# Patient Record
Sex: FEMALE | Race: WHITE | NOT HISPANIC OR LATINO | Employment: UNEMPLOYED | URBAN - METROPOLITAN AREA
[De-identification: names, ages, dates, MRNs, and addresses within clinical notes are randomized per-mention and may not be internally consistent; named-entity substitution may affect disease eponyms.]

---

## 2022-02-03 RX ORDER — DIPHENOXYLATE HYDROCHLORIDE AND ATROPINE SULFATE 2.5; .025 MG/1; MG/1
1 TABLET ORAL DAILY
COMMUNITY

## 2022-02-04 ENCOUNTER — APPOINTMENT (OUTPATIENT)
Dept: LAB | Facility: CLINIC | Age: 57
End: 2022-02-04
Payer: COMMERCIAL

## 2022-02-04 ENCOUNTER — OFFICE VISIT (OUTPATIENT)
Dept: FAMILY MEDICINE CLINIC | Facility: CLINIC | Age: 57
End: 2022-02-04
Payer: COMMERCIAL

## 2022-02-04 VITALS
BODY MASS INDEX: 23.49 KG/M2 | SYSTOLIC BLOOD PRESSURE: 136 MMHG | HEART RATE: 84 BPM | HEIGHT: 65 IN | WEIGHT: 141 LBS | DIASTOLIC BLOOD PRESSURE: 82 MMHG

## 2022-02-04 DIAGNOSIS — H26.9 CATARACT OF RIGHT EYE, UNSPECIFIED CATARACT TYPE: ICD-10-CM

## 2022-02-04 DIAGNOSIS — R79.89 ABNORMAL TSH: ICD-10-CM

## 2022-02-04 DIAGNOSIS — I49.3 PREMATURE VENTRICULAR CONTRACTIONS (PVCS) (VPCS): ICD-10-CM

## 2022-02-04 DIAGNOSIS — Z01.818 PREOPERATIVE CLEARANCE: ICD-10-CM

## 2022-02-04 DIAGNOSIS — Z12.11 SCREEN FOR COLON CANCER: ICD-10-CM

## 2022-02-04 DIAGNOSIS — I10 ELEVATED BLOOD PRESSURE READING IN OFFICE WITH DIAGNOSIS OF HYPERTENSION: ICD-10-CM

## 2022-02-04 DIAGNOSIS — Z11.4 SCREENING FOR HIV (HUMAN IMMUNODEFICIENCY VIRUS): ICD-10-CM

## 2022-02-04 DIAGNOSIS — Z13.220 ENCOUNTER FOR SCREENING FOR LIPID DISORDER: ICD-10-CM

## 2022-02-04 DIAGNOSIS — Z11.59 NEED FOR HEPATITIS C SCREENING TEST: ICD-10-CM

## 2022-02-04 DIAGNOSIS — R00.2 PALPITATIONS: ICD-10-CM

## 2022-02-04 DIAGNOSIS — Z72.0 TOBACCO ABUSE: ICD-10-CM

## 2022-02-04 DIAGNOSIS — Z01.818 PREOPERATIVE CLEARANCE: Primary | ICD-10-CM

## 2022-02-04 DIAGNOSIS — Z12.4 SCREENING FOR CERVICAL CANCER: ICD-10-CM

## 2022-02-04 DIAGNOSIS — Z12.31 ENCOUNTER FOR SCREENING MAMMOGRAM FOR MALIGNANT NEOPLASM OF BREAST: ICD-10-CM

## 2022-02-04 DIAGNOSIS — R31.21 ASYMPTOMATIC MICROSCOPIC HEMATURIA: ICD-10-CM

## 2022-02-04 DIAGNOSIS — Z00.00 HEALTH CARE MAINTENANCE: ICD-10-CM

## 2022-02-04 LAB
ALBUMIN SERPL BCP-MCNC: 3.8 G/DL (ref 3.5–5)
ALP SERPL-CCNC: 73 U/L (ref 46–116)
ALT SERPL W P-5'-P-CCNC: 21 U/L (ref 12–78)
AMORPH URATE CRY URNS QL MICRO: ABNORMAL /HPF
ANION GAP SERPL CALCULATED.3IONS-SCNC: 6 MMOL/L (ref 4–13)
AST SERPL W P-5'-P-CCNC: 16 U/L (ref 5–45)
BACTERIA UR QL AUTO: ABNORMAL /HPF
BILIRUB SERPL-MCNC: 0.92 MG/DL (ref 0.2–1)
BILIRUB UR QL STRIP: NEGATIVE
BUN SERPL-MCNC: 20 MG/DL (ref 5–25)
CALCIUM SERPL-MCNC: 9.6 MG/DL (ref 8.3–10.1)
CHLORIDE SERPL-SCNC: 106 MMOL/L (ref 100–108)
CHOLEST SERPL-MCNC: 240 MG/DL
CLARITY UR: ABNORMAL
CO2 SERPL-SCNC: 26 MMOL/L (ref 21–32)
COLOR UR: ABNORMAL
CREAT SERPL-MCNC: 0.72 MG/DL (ref 0.6–1.3)
ERYTHROCYTE [DISTWIDTH] IN BLOOD BY AUTOMATED COUNT: 12 % (ref 11.6–15.1)
GFR SERPL CREATININE-BSD FRML MDRD: 93 ML/MIN/1.73SQ M
GLUCOSE SERPL-MCNC: 104 MG/DL (ref 65–140)
GLUCOSE UR STRIP-MCNC: NEGATIVE MG/DL
HCT VFR BLD AUTO: 37.8 % (ref 34.8–46.1)
HDLC SERPL-MCNC: 50 MG/DL
HGB BLD-MCNC: 13 G/DL (ref 11.5–15.4)
HGB UR QL STRIP.AUTO: ABNORMAL
KETONES UR STRIP-MCNC: NEGATIVE MG/DL
LDLC SERPL DIRECT ASSAY-MCNC: 89 MG/DL (ref 0–100)
LEUKOCYTE ESTERASE UR QL STRIP: ABNORMAL
MCH RBC QN AUTO: 32 PG (ref 26.8–34.3)
MCHC RBC AUTO-ENTMCNC: 34.4 G/DL (ref 31.4–37.4)
MCV RBC AUTO: 93 FL (ref 82–98)
NITRITE UR QL STRIP: NEGATIVE
NON-SQ EPI CELLS URNS QL MICRO: ABNORMAL /HPF
PH UR STRIP.AUTO: 5.5 [PH]
PLATELET # BLD AUTO: 329 THOUSANDS/UL (ref 149–390)
PMV BLD AUTO: 10 FL (ref 8.9–12.7)
POTASSIUM SERPL-SCNC: 4.3 MMOL/L (ref 3.5–5.3)
PROT SERPL-MCNC: 7.8 G/DL (ref 6.4–8.2)
PROT UR STRIP-MCNC: NEGATIVE MG/DL
RBC # BLD AUTO: 4.06 MILLION/UL (ref 3.81–5.12)
RBC #/AREA URNS AUTO: ABNORMAL /HPF
SODIUM SERPL-SCNC: 138 MMOL/L (ref 136–145)
SP GR UR STRIP.AUTO: >=1.03 (ref 1–1.03)
TRIGL SERPL-MCNC: 509 MG/DL
TSH SERPL DL<=0.05 MIU/L-ACNC: 1.36 UIU/ML (ref 0.36–3.74)
UROBILINOGEN UR QL STRIP.AUTO: 0.2 E.U./DL
WBC # BLD AUTO: 7.01 THOUSAND/UL (ref 4.31–10.16)
WBC #/AREA URNS AUTO: ABNORMAL /HPF

## 2022-02-04 PROCEDURE — 80061 LIPID PANEL: CPT

## 2022-02-04 PROCEDURE — 99204 OFFICE O/P NEW MOD 45 MIN: CPT | Performed by: FAMILY MEDICINE

## 2022-02-04 PROCEDURE — 83721 ASSAY OF BLOOD LIPOPROTEIN: CPT

## 2022-02-04 PROCEDURE — 85027 COMPLETE CBC AUTOMATED: CPT

## 2022-02-04 PROCEDURE — 80053 COMPREHEN METABOLIC PANEL: CPT

## 2022-02-04 PROCEDURE — 84443 ASSAY THYROID STIM HORMONE: CPT

## 2022-02-04 PROCEDURE — 93000 ELECTROCARDIOGRAM COMPLETE: CPT | Performed by: FAMILY MEDICINE

## 2022-02-04 PROCEDURE — 3008F BODY MASS INDEX DOCD: CPT | Performed by: FAMILY MEDICINE

## 2022-02-04 PROCEDURE — 36415 COLL VENOUS BLD VENIPUNCTURE: CPT

## 2022-02-04 PROCEDURE — 81001 URINALYSIS AUTO W/SCOPE: CPT

## 2022-02-04 NOTE — PROGRESS NOTES
Assessment and Plan:  1  Preoperative clearance, exam completed EKG reviewed   2  Cataract, OD, for surgery 02/15/2022 at [facility] with Dr Lennox Hastings  2  PVC/palpitation, EKG normal today no PVC seen  3  Abnormal TSH, blood work ordered   4  Hematuria seen by Urology  5  Healthcare maintenance blood work ordered to include lipid panel, hepatitis-C and HIV  6  Tobacco abuse, complete cessation recommended  7  Elevated blood pressure normalized by end of office visit  8   Return in 1 year sooner if needed    Problem List Items Addressed This Visit        Cardiovascular and Mediastinum    Premature ventricular contractions (PVCs) (VPCs)     EKG completed         Relevant Orders    CBC    Comprehensive metabolic panel    Lipid Panel with Direct LDL reflex    TSH, 3rd generation with Free T4 reflex    UA (URINE) with reflex to Scope       Genitourinary    Asymptomatic microscopic hematuria     Seen by urology in the past         Relevant Orders    CBC    Comprehensive metabolic panel    Lipid Panel with Direct LDL reflex    TSH, 3rd generation with Free T4 reflex    UA (URINE) with reflex to Scope       Other    Abnormal TSH     Blood work ordered         Relevant Orders    CBC    Comprehensive metabolic panel    Lipid Panel with Direct LDL reflex    TSH, 3rd generation with Free T4 reflex    UA (URINE) with reflex to Scope    Palpitations     EKG completed         Relevant Orders    CBC    Comprehensive metabolic panel    Lipid Panel with Direct LDL reflex    TSH, 3rd generation with Free T4 reflex    UA (URINE) with reflex to Scope    Health care maintenance     Routine blood work ordered to include hepatitis C, HIV, lipid panel         Relevant Orders    CBC    Comprehensive metabolic panel    Lipid Panel with Direct LDL reflex    TSH, 3rd generation with Free T4 reflex    UA (URINE) with reflex to Scope    Screen for colon cancer     Referred for colonoscopy         Relevant Orders    Ambulatory referral for colonoscopy    CBC    Comprehensive metabolic panel    Lipid Panel with Direct LDL reflex    TSH, 3rd generation with Free T4 reflex    UA (URINE) with reflex to Scope    Tobacco abuse     Complete cessation recommended         Relevant Orders    CBC    Comprehensive metabolic panel    Lipid Panel with Direct LDL reflex    TSH, 3rd generation with Free T4 reflex    UA (URINE) with reflex to Scope      Other Visit Diagnoses     Preoperative clearance    -  Primary    Relevant Orders    CBC    Comprehensive metabolic panel    Lipid Panel with Direct LDL reflex    TSH, 3rd generation with Free T4 reflex    UA (URINE) with reflex to Scope    POCT ECG (Completed)    Encounter for screening mammogram for malignant neoplasm of breast        Relevant Orders    Mammo screening bilateral w 3d & cad    CBC    Comprehensive metabolic panel    Lipid Panel with Direct LDL reflex    TSH, 3rd generation with Free T4 reflex    UA (URINE) with reflex to Scope    Screening for cervical cancer        Relevant Orders    Ambulatory Referral to Gynecology    CBC    Comprehensive metabolic panel    Lipid Panel with Direct LDL reflex    TSH, 3rd generation with Free T4 reflex    UA (URINE) with reflex to Scope    Cataract of right eye, unspecified cataract type        Relevant Orders    CBC    Comprehensive metabolic panel    Lipid Panel with Direct LDL reflex    TSH, 3rd generation with Free T4 reflex    UA (URINE) with reflex to Scope    Need for hepatitis C screening test        Relevant Orders    Hepatitis C Antibody ([facility])    CBC    Comprehensive metabolic panel    Lipid Panel with Direct LDL reflex    TSH, 3rd generation with Free T4 reflex    UA (URINE) with reflex to Scope    Screening for HIV (human immunodeficiency virus)        Relevant Orders    HIV 1/2 Antigen/Antibody (4th Generation) w Reflex [facility]    CBC    Comprehensive metabolic panel    Lipid Panel with Direct LDL reflex    TSH, 3rd generation with Free T4 reflex    UA (URINE) with reflex to Scope    Encounter for screening for lipid disorder        Relevant Orders    CBC    Comprehensive metabolic panel    Lipid Panel with Direct LDL reflex    TSH, 3rd generation with Free T4 reflex    UA (URINE) with reflex to Scope    Elevated blood pressure reading in office with diagnosis of hypertension                     Diagnoses and all orders for this visit:    Preoperative clearance  -     CBC; Future  -     Comprehensive metabolic panel; Future  -     Lipid Panel with Direct LDL reflex; Future  -     TSH, 3rd generation with Free T4 reflex; Future  -     UA (URINE) with reflex to Scope; Future  -     POCT ECG    Screen for colon cancer  -     Ambulatory referral for colonoscopy; Future  -     CBC; Future  -     Comprehensive metabolic panel; Future  -     Lipid Panel with Direct LDL reflex; Future  -     TSH, 3rd generation with Free T4 reflex; Future  -     UA (URINE) with reflex to Scope; Future    Encounter for screening mammogram for malignant neoplasm of breast  -     Mammo screening bilateral w 3d & cad; Future  -     CBC; Future  -     Comprehensive metabolic panel; Future  -     Lipid Panel with Direct LDL reflex; Future  -     TSH, 3rd generation with Free T4 reflex; Future  -     UA (URINE) with reflex to Scope; Future    Screening for cervical cancer  -     Ambulatory Referral to Gynecology; Future  -     CBC; Future  -     Comprehensive metabolic panel; Future  -     Lipid Panel with Direct LDL reflex; Future  -     TSH, 3rd generation with Free T4 reflex; Future  -     UA (URINE) with reflex to Scope; Future    Cataract of right eye, unspecified cataract type  -     CBC; Future  -     Comprehensive metabolic panel; Future  -     Lipid Panel with Direct LDL reflex; Future  -     TSH, 3rd generation with Free T4 reflex; Future  -     UA (URINE) with reflex to Scope; Future    Premature ventricular contractions (PVCs) (VPCs)  -     CBC;  Future  - Comprehensive metabolic panel; Future  -     Lipid Panel with Direct LDL reflex; Future  -     TSH, 3rd generation with Free T4 reflex; Future  -     UA (URINE) with reflex to Scope; Future    Tobacco abuse  -     CBC; Future  -     Comprehensive metabolic panel; Future  -     Lipid Panel with Direct LDL reflex; Future  -     TSH, 3rd generation with Free T4 reflex; Future  -     UA (URINE) with reflex to Scope; Future    Palpitations  -     CBC; Future  -     Comprehensive metabolic panel; Future  -     Lipid Panel with Direct LDL reflex; Future  -     TSH, 3rd generation with Free T4 reflex; Future  -     UA (URINE) with reflex to Scope; Future    Abnormal TSH  -     CBC; Future  -     Comprehensive metabolic panel; Future  -     Lipid Panel with Direct LDL reflex; Future  -     TSH, 3rd generation with Free T4 reflex; Future  -     UA (URINE) with reflex to Scope; Future    Asymptomatic microscopic hematuria  -     CBC; Future  -     Comprehensive metabolic panel; Future  -     Lipid Panel with Direct LDL reflex; Future  -     TSH, 3rd generation with Free T4 reflex; Future  -     UA (URINE) with reflex to Scope; Future    Need for hepatitis C screening test  -     Hepatitis C Antibody ([facility]); Future  -     CBC; Future  -     Comprehensive metabolic panel; Future  -     Lipid Panel with Direct LDL reflex; Future  -     TSH, 3rd generation with Free T4 reflex; Future  -     UA (URINE) with reflex to Scope; Future    Screening for HIV (human immunodeficiency virus)  -     HIV 1/2 Antigen/Antibody (4th Generation) w Reflex [facility]; Future  -     CBC; Future  -     Comprehensive metabolic panel; Future  -     Lipid Panel with Direct LDL reflex; Future  -     TSH, 3rd generation with Free T4 reflex; Future  -     UA (URINE) with reflex to Scope; Future    Encounter for screening for lipid disorder  -     CBC; Future  -     Comprehensive metabolic panel;  Future  -     Lipid Panel with Direct LDL reflex; Future  -     TSH, 3rd generation with Free T4 reflex; Future  -     UA (URINE) with reflex to Scope; Future    Health care maintenance  -     CBC; Future  -     Comprehensive metabolic panel; Future  -     Lipid Panel with Direct LDL reflex; Future  -     TSH, 3rd generation with Free T4 reflex; Future  -     UA (URINE) with reflex to Scope; Future    Elevated blood pressure reading in office with diagnosis of hypertension    Other orders  -     multivitamin (THERAGRAN) TABS; Take 1 tablet by mouth daily              Subjective:      Patient ID: Piter Wallace is a 65 y o  female  CC:    Chief Complaint   Patient presents with   [facility]    Pre-op Exam     Cataract surgery Right eye scheduled 2/15 [facility] Dr Carlus Milch       HPI:    Patient is here for preoperative clearance she will be getting a right cataract surgery 02/15/2022 by Dr Hans Downey at [facility]      The following portions of the patient's history were reviewed and updated as appropriate: allergies, current medications, past family history, past medical history, past social history, past surgical history and problem list       Review of Systems   Constitutional: Negative  HENT: Negative  Eyes:        HPI   Respiratory: Negative  Cardiovascular: Negative  Gastrointestinal:        Has not had a colonoscopy   Endocrine: Negative  Genitourinary:        Overdue for gyn evaluation   Musculoskeletal: Negative  Skin: Negative  Allergic/Immunologic: Negative  Neurological: Negative  Hematological: Negative  Psychiatric/Behavioral: Negative  Data to review:       Objective:    Vitals:    02/04/22 0841 02/04/22 0904   BP: 160/86 136/82   Pulse: 84    Weight: 64 kg (141 lb)    Height: 5' 4 5" (1 638 m)         Physical Exam  Vitals and nursing note reviewed  Constitutional:       Appearance: Normal appearance  HENT:      Head: Normocephalic and atraumatic        Right Ear: Tympanic membrane normal       Left Ear: Tympanic membrane normal       Nose: Nose normal       Mouth/Throat:      Mouth: Mucous membranes are moist       Pharynx: Oropharynx is clear  No oropharyngeal exudate or posterior oropharyngeal erythema  Eyes:      General: No scleral icterus  Neck:      Vascular: No carotid bruit  Cardiovascular:      Rate and Rhythm: Normal rate and regular rhythm  Heart sounds: Normal heart sounds  Pulmonary:      Effort: Pulmonary effort is normal       Breath sounds: Normal breath sounds  Abdominal:      General: Bowel sounds are normal       Palpations: Abdomen is soft  Tenderness: There is no abdominal tenderness  Musculoskeletal:      Cervical back: Neck supple  Right lower leg: No edema  Left lower leg: No edema  Skin:     General: Skin is warm and dry  Neurological:      General: No focal deficit present  Mental Status: She is alert  Psychiatric:         Mood and Affect: Mood normal              BMI Counseling: Body mass index is 23 83 kg/m²  The BMI is above normal  Nutrition recommendations include reducing intake of cholesterol

## 2022-02-04 NOTE — PATIENT INSTRUCTIONS
Complete blood work today   Complete mammography as ordered   Complete gyn evaluation as ordered   Complete colonoscopy as ordered   I recommend complete tobacco cessation  Return in 1 year sooner if needed, depended on above blood work

## 2022-02-07 PROBLEM — E78.2 MIXED HYPERLIPIDEMIA: Status: ACTIVE | Noted: 2022-02-07

## 2022-10-12 PROBLEM — Z12.11 SCREEN FOR COLON CANCER: Status: RESOLVED | Noted: 2022-02-04 | Resolved: 2022-10-12

## 2022-10-12 PROBLEM — Z00.00 HEALTH CARE MAINTENANCE: Status: RESOLVED | Noted: 2022-02-04 | Resolved: 2022-10-12

## 2022-10-25 ENCOUNTER — TELEPHONE (OUTPATIENT)
Dept: GASTROENTEROLOGY | Facility: CLINIC | Age: 57
End: 2022-10-25

## 2022-10-25 DIAGNOSIS — Z12.11 SCREEN FOR COLON CANCER: Primary | ICD-10-CM

## 2022-10-25 NOTE — TELEPHONE ENCOUNTER
Scheduled date of colonoscopy (as of today):12 09 22  Physician performing colonoscopy:DR CHAVIRA  Location of colonoscopy:[place]  Bowel prep reviewed with patient:KATH  Instructions reviewed with patient by:SUMEET TAVERAS  Clearances: N/A      09/14/22  Screened by: Juan Diego Little    Referring Provider Dr Michael Vee: Body mass index is 23 83 kg/m²  Has patient been referred for a routine screening Colonoscopy? yes  Is the patient between 39-70 years old? yes      Previous Colonoscopy no   If yes:    Date:     Facility:    Reason:       SCHEDULING STAFF: If the patient is between 45yrs-49yrs, please advise patient to confirm benefits/coverage with their insurance company for a routine screening colonoscopy, some insurance carriers will only cover at [address] or older  If the patient is over 66years old, please schedule an office visit  Does the patient want to see a Gastroenterologist prior to their procedure OR are they having any GI symptoms? no    Has the patient been hospitalized or had abdominal surgery in the past 6 months? no    Does the patient use supplemental oxygen? no    Does the patient take Coumadin, Lovenox, Plavix, Elliquis, Xarelto, or other blood thinning medication? no    Has the patient had a stroke, cardiac event, or stent placed in the past year? no    SCHEDULING STAFF: If patient answers NO to above questions, then schedule procedure  If patient answers YES to above questions, then schedule office appointment  Patient passed OA please reach out to patient at [phone]     If patient is between 45yrs - 49yrs, please advise patient that we will have to confirm benefits & coverage with their insurance company for a routine screening colonoscopy

## 2022-11-11 ENCOUNTER — OFFICE VISIT (OUTPATIENT)
Dept: URGENT CARE | Facility: CLINIC | Age: 57
End: 2022-11-11

## 2022-11-11 VITALS
OXYGEN SATURATION: 97 % | DIASTOLIC BLOOD PRESSURE: 64 MMHG | RESPIRATION RATE: 18 BRPM | TEMPERATURE: 97.9 F | SYSTOLIC BLOOD PRESSURE: 128 MMHG | HEART RATE: 79 BPM

## 2022-11-11 DIAGNOSIS — J01.90 ACUTE SINUSITIS, RECURRENCE NOT SPECIFIED, UNSPECIFIED LOCATION: Primary | ICD-10-CM

## 2022-11-11 RX ORDER — ASCORBIC ACID 500 MG
500 TABLET ORAL DAILY
COMMUNITY

## 2022-11-11 RX ORDER — AMOXICILLIN AND CLAVULANATE POTASSIUM 875; 125 MG/1; MG/1
1 TABLET, FILM COATED ORAL EVERY 12 HOURS SCHEDULED
Qty: 14 TABLET | Refills: 0 | Status: SHIPPED | OUTPATIENT
Start: 2022-11-11 | End: 2022-11-18

## 2022-11-11 NOTE — PROGRESS NOTES
[facility]    NAME: John Cordon is a 65 y o  female  : 1965    MRN: [id number]  DATE: 2022  TIME: 12:18 PM    Assessment and Plan   Acute sinusitis, recurrence not specified, unspecified location [J01 90]  1  Acute sinusitis, recurrence not specified, unspecified location  amoxicillin-clavulanate (AUGMENTIN) 875-125 mg per tablet       Patient Instructions   Patient Instructions   Take antibiotic as instructed  May continue decongestant / expectorant for comfort as needed  Push fluids  Nasal saline rinses every couple hours throughout day may be helpful  Follow up with PCP if not improving over next 7-10 days  Chief Complaint     Chief Complaint   Patient presents with   • Cold Like Symptoms     Patient is c/o right facial pain and pressure  Had started getting sick 2 weeks ago on   Was feeling better on Monday and then started feeling bad again on Wednesday  COVID negative last week       History of Present Illness   John Cordon presents to the clinic c/o  46-year-old male comes in with complaint of sinus problems  Started:  Over 2 weeks ago with general cold symptoms  It has now localized to the right maxillary sinus area with increased pain and pressure  Modifying factors:  Multiple over-the-counter medications  Associated Signs and symptoms:  Pain pressure right maxillary and frontal sinus area  Congested  Thick mucus  Known exposures:  Lives with her daughter who has 3 children  10year-old just started  this year in came home with cold symptoms couple weeks ago  Patient is done  2 home COVID test that have been negative  Review of Systems   Review of Systems   Constitutional: Positive for activity change, appetite change and fatigue  Negative for chills and fever  HENT: Positive for congestion, postnasal drip, rhinorrhea, sinus pressure and sinus pain  Negative for ear discharge, ear pain and sore throat      Eyes: Negative  Respiratory: Negative for cough, chest tightness, shortness of breath and wheezing  Cough resolved   Cardiovascular: Negative  Hematological: Negative  Current Medications     Long-Term Medications   Medication Sig Dispense Refill   • ascorbic acid (VITAMIN C) 500 MG tablet Take 500 mg by mouth daily     • multivitamin (THERAGRAN) TABS Take 1 tablet by mouth daily     • polyethylene glycol (GOLYTELY) 4000 mL solution Take 4,000 mL by mouth once for 1 dose Take as directed by office 4000 mL 0       Current Allergies     Allergies as of 11/11/2022 - Reviewed 11/11/2022   Allergen Reaction Noted   • Tramadol Vomiting 07/15/2014          The following portions of the patient's history were reviewed and updated as appropriate: allergies, current medications, past family history, past medical history, past social history, past surgical history and problem list   History reviewed  No pertinent past medical history  Past Surgical History:   Procedure Laterality Date   • OVARIAN CYST SURGERY  1984     Family History   Problem Relation Age of Onset   • No Known Problems Mother    • Cancer Father    • Stroke Father    • Heart disease Maternal Uncle        Objective   /64   Pulse 79   Temp 97 9 °F (36 6 °C) (Tympanic)   Resp 18   SpO2 97%   No LMP recorded  Patient is postmenopausal        Physical Exam     Physical Exam  Vitals and nursing note reviewed  Constitutional:       General: She is not in acute distress  Appearance: She is well-developed  She is ill-appearing  She is not toxic-appearing or diaphoretic  Comments: Appears mildly ill but in no acute distress  No trismus or conversational dyspnea  HENT:      Head: Normocephalic and atraumatic  Comments: Right maxillary sinus TTP     Right Ear: Tympanic membrane, ear canal and external ear normal       Left Ear: Tympanic membrane, ear canal and external ear normal       Nose: Congestion and rhinorrhea present  Mouth/Throat:      Mouth: Mucous membranes are moist       Pharynx: Posterior oropharyngeal erythema present  No oropharyngeal exudate  Comments: Cobblestoning posterior pharynx with patchy redness  Eyes:      General:         Right eye: No discharge  Left eye: No discharge  Conjunctiva/sclera: Conjunctivae normal       Pupils: Pupils are equal, round, and reactive to light  Cardiovascular:      Rate and Rhythm: Normal rate and regular rhythm  Heart sounds: Normal heart sounds  No murmur heard  No friction rub  No gallop  Pulmonary:      Effort: Pulmonary effort is normal  No respiratory distress  Breath sounds: Normal breath sounds  No stridor  No wheezing, rhonchi or rales  Musculoskeletal:      Cervical back: Normal range of motion and neck supple  No rigidity or tenderness  Lymphadenopathy:      Cervical: No cervical adenopathy  Skin:     General: Skin is warm and dry  Coloration: Skin is not jaundiced or pale  Findings: No rash  Neurological:      Mental Status: She is alert and oriented to person, place, and time     Psychiatric:         Mood and Affect: Mood normal          Behavior: Behavior normal

## 2022-11-11 NOTE — PATIENT INSTRUCTIONS
Take antibiotic as instructed  May continue decongestant / expectorant for comfort as needed  Push fluids  Nasal saline rinses every couple hours throughout day may be helpful  Follow up with PCP if not improving over next 7-10 days

## 2023-02-13 RX ORDER — SODIUM CHLORIDE 9 MG/ML
125 INJECTION, SOLUTION INTRAVENOUS CONTINUOUS
OUTPATIENT
Start: 2023-02-13

## 2023-02-13 RX ORDER — ONDANSETRON 2 MG/ML
4 INJECTION INTRAMUSCULAR; INTRAVENOUS ONCE AS NEEDED
OUTPATIENT
Start: 2023-02-13

## 2023-03-31 ENCOUNTER — TELEPHONE (OUTPATIENT)
Dept: ADMINISTRATIVE | Facility: OTHER | Age: 58
End: 2023-03-31

## 2023-03-31 ENCOUNTER — OFFICE VISIT (OUTPATIENT)
Dept: FAMILY MEDICINE CLINIC | Facility: CLINIC | Age: 58
End: 2023-03-31

## 2023-03-31 VITALS
HEART RATE: 90 BPM | RESPIRATION RATE: 20 BRPM | DIASTOLIC BLOOD PRESSURE: 90 MMHG | BODY MASS INDEX: 24.99 KG/M2 | OXYGEN SATURATION: 98 % | WEIGHT: 150 LBS | TEMPERATURE: 98.3 F | SYSTOLIC BLOOD PRESSURE: 152 MMHG | HEIGHT: 65 IN

## 2023-03-31 DIAGNOSIS — R10.12 LUQ PAIN: Primary | ICD-10-CM

## 2023-03-31 DIAGNOSIS — R31.21 ASYMPTOMATIC MICROSCOPIC HEMATURIA: ICD-10-CM

## 2023-03-31 DIAGNOSIS — Z12.31 BREAST CANCER SCREENING BY MAMMOGRAM: ICD-10-CM

## 2023-03-31 DIAGNOSIS — E78.2 MIXED HYPERLIPIDEMIA: ICD-10-CM

## 2023-03-31 DIAGNOSIS — R19.7 DIARRHEA, UNSPECIFIED TYPE: ICD-10-CM

## 2023-03-31 DIAGNOSIS — M54.50 LOW BACK PAIN, UNSPECIFIED BACK PAIN LATERALITY, UNSPECIFIED CHRONICITY, UNSPECIFIED WHETHER SCIATICA PRESENT: ICD-10-CM

## 2023-03-31 DIAGNOSIS — R20.2 PARESTHESIAS: ICD-10-CM

## 2023-03-31 DIAGNOSIS — Z12.31 ENCOUNTER FOR SCREENING MAMMOGRAM FOR MALIGNANT NEOPLASM OF BREAST: ICD-10-CM

## 2023-03-31 DIAGNOSIS — Z13.1 SCREENING FOR DIABETES MELLITUS: ICD-10-CM

## 2023-03-31 LAB
SL AMB  POCT GLUCOSE, UA: NEGATIVE
SL AMB LEUKOCYTE ESTERASE,UA: NORMAL
SL AMB POCT BILIRUBIN,UA: NEGATIVE
SL AMB POCT BLOOD,UA: NORMAL
SL AMB POCT CLARITY,UA: CLEAR
SL AMB POCT COLOR,UA: YELLOW
SL AMB POCT KETONES,UA: NEGATIVE
SL AMB POCT NITRITE,UA: NEGATIVE
SL AMB POCT PH,UA: 6
SL AMB POCT SPECIFIC GRAVITY,UA: 1.01
SL AMB POCT URINE PROTEIN: NEGATIVE
SL AMB POCT UROBILINOGEN: 0.2

## 2023-03-31 RX ORDER — GABAPENTIN 100 MG/1
100 CAPSULE ORAL 3 TIMES DAILY PRN
Qty: 30 CAPSULE | Refills: 0 | Status: SHIPPED | OUTPATIENT
Start: 2023-03-31

## 2023-03-31 RX ORDER — DICYCLOMINE HYDROCHLORIDE 10 MG/1
10 CAPSULE ORAL 4 TIMES DAILY PRN
Qty: 30 CAPSULE | Refills: 0 | Status: SHIPPED | OUTPATIENT
Start: 2023-03-31

## 2023-03-31 NOTE — ASSESSMENT & PLAN NOTE
Multiple labs were ordered to assess for causes of recurrent abdominal pain including inflammatory, infectious, or insensitivity causes  CT of the abdomen and pelvis was also ordered to assess for causes of recurrent abdominal pain and diarrhea  GI referral was also placed so that colonoscopy can be scheduled  Bentyl was ordered to be used as needed for abdominal pain and diarrhea

## 2023-03-31 NOTE — ASSESSMENT & PLAN NOTE
CT of the abdomen was ordered to assess for any recurrent nephrolithiasis or other causes of recurrent hematuria

## 2023-03-31 NOTE — PROGRESS NOTES
Name: Ricky Perez      : 1965      MRN: [id number]  Encounter Provider: FANTA Coulter  Encounter Date: 3/31/2023   Encounter department: [place] [address]  LUQ pain  Assessment & Plan:  Multiple labs were ordered to assess for causes of recurrent abdominal pain including inflammatory, infectious, or insensitivity causes  CT of the abdomen and pelvis was also ordered to assess for causes of recurrent abdominal pain and diarrhea  GI referral was also placed so that colonoscopy can be scheduled  Bentyl was ordered to be used as needed for abdominal pain and diarrhea  Orders:  -     CBC and differential; Future  -     C-reactive protein; Future  -     Sedimentation rate, automated; Future  -     Celiac Disease Antibody Profile; Future  -     Food Allergy Profile; Future  -     Amylase; Future  -     Lipase; Future  -     CT abdomen pelvis w contrast; Future; Expected date: 2023  -     Ambulatory Referral to Gastroenterology; Future  -     dicyclomine (BENTYL) 10 mg capsule; Take 1 capsule (10 mg total) by mouth 4 (four) times a day as needed (Abdominal pain or diarrhea )    2  Breast cancer screening by mammogram    3  Low back pain, unspecified back pain laterality, unspecified chronicity, unspecified whether sciatica present  -     POCT urine dip  -     Urine culture; Future  -     Urine culture    4  Mixed hyperlipidemia  Assessment & Plan:  Lipid panel was ordered to be completed prior to next office visit to assess the status of this  Patient was advised to continue to limit fatty and fried foods in her diet  Orders:  -     Lipid Panel with Direct LDL reflex; Future    5  Encounter for screening mammogram for malignant neoplasm of breast  -     Mammo screening bilateral w 3d & cad; Future; Expected date: 2023    6  Diarrhea, unspecified type  -     CBC and differential; Future  -     C-reactive protein;  Future  -     Sedimentation rate, automated; Future  -     Celiac Disease Antibody Profile; Future  -     Food Allergy Profile; Future  -     Amylase; Future  -     Lipase; Future  -     CT abdomen pelvis w contrast; Future; Expected date: 03/31/2023  -     Ambulatory Referral to Gastroenterology; Future  -     dicyclomine (BENTYL) 10 mg capsule; Take 1 capsule (10 mg total) by mouth 4 (four) times a day as needed (Abdominal pain or diarrhea )    7  Asymptomatic microscopic hematuria  Assessment & Plan:  CT of the abdomen was ordered to assess for any recurrent nephrolithiasis or other causes of recurrent hematuria  Orders:  -     CT abdomen pelvis w contrast; Future; Expected date: 03/31/2023    8  Paresthesias  Assessment & Plan:  Patient's symptoms seem most consistent with possible prior herpes zoster infection which never manifested itself as a rash  I would like to trial the patient on gabapentin as needed to see if this helps to improve her paresthesias  Orders:  -     gabapentin (Neurontin) 100 mg capsule; Take 1 capsule (100 mg total) by mouth 3 (three) times a day as needed (Back pain, numbness, and tingling)    9  Screening for diabetes mellitus  -     Comprehensive metabolic panel; Future        Depression Screening and Follow-up Plan: Patient was screened for depression during today's encounter  They screened negative with a PHQ-2 score of 0  Tobacco Cessation Counseling: Tobacco cessation counseling was provided  The patient is sincerely urged to quit consumption of tobacco  She is not ready to quit tobacco          Subjective      Left flank pain: The patient reports that she has been experiencing recurrent left lower back pain with radiation to her left abdomen as well  She reports increased flatus as well  She denies any nausea, vomiting, or constipation  She reports she has been having recurrent episodes of diarrhea as well  She reports she often has increased pain after eating food such as peanuts as well    Patient "denies any melena, hematochezia, fevers, or chills  Patient has not had a colonoscopy completed in the past   She reports that she often has a normal bowel movement when she wakes up in the morning but will have multiple episodes of diarrhea throughout the day  She reports that food sometimes seems to cause diarrhea but other times does not seem to affect her stomach  Urine dip performed in the office today did show trace leukocyte esterase and trace hematuria  Patient reports that she does have a longstanding history of hematuria and has seen urology in the past for this  Patient did have a CT of her abdomen completed in 2020 which did show a small left renal calculus  Hyperlipidemia: Patient's most recent lipid panel was completed in 2022 and showed elevated total cholesterol and triglycerides  Patient is not currently taking medication for this  Paresthesias of back: Patient reports that she has been experiencing recurrent left mid back paresthesias and pruritus over the past few weeks  She reports that it originally began as a constant \"itch\" but has since progressed to recurrent paresthesias as well as a burning sensation  Patient denies ever noting any shingles or rashes in the area  Review of Systems   Constitutional: Negative for chills and fever  HENT: Negative for ear pain and sore throat  Eyes: Negative for pain and visual disturbance  Respiratory: Negative for cough, chest tightness, shortness of breath and wheezing  Cardiovascular: Negative for chest pain, palpitations and leg swelling  Gastrointestinal: Positive for abdominal pain (LUQ) and diarrhea (recurrent)  Negative for abdominal distention, anal bleeding, blood in stool, constipation, nausea, rectal pain and vomiting  Endocrine: Negative for cold intolerance and heat intolerance  Genitourinary: Negative for decreased urine volume, dysuria and hematuria  Musculoskeletal: Positive for myalgias (left mid back)   " "Negative for arthralgias and back pain  Skin: Negative for color change and rash  Allergic/Immunologic: Negative for environmental allergies  Neurological: Negative for dizziness, seizures, syncope, weakness, light-headedness, numbness and headaches  Hematological: Negative for adenopathy  Psychiatric/Behavioral: Negative for confusion  The patient is not nervous/anxious  All other systems reviewed and are negative  Current Outpatient Medications on File Prior to Visit   Medication Sig   • ascorbic acid (VITAMIN C) 500 MG tablet Take 500 mg by mouth daily   • Multiple Vitamin (MULTIVITAMIN ADULT PO) Take 1 tablet by mouth daily   • multivitamin (THERAGRAN) TABS Take 1 tablet by mouth daily   • POTASSIUM CHLORIDE PO Take by mouth   • polyethylene glycol (GOLYTELY) 4000 mL solution Take 4,000 mL by mouth once for 1 dose Take as directed by office       Objective     /90 (BP Location: Right arm, Patient Position: Sitting, Cuff Size: Standard)   Pulse 90   Temp 98 3 °F (36 8 °C) (Tympanic)   Resp 20   Ht 5' 5\" (1 651 m)   Wt 68 kg (150 lb)   SpO2 98%   BMI 24 96 kg/m²     Physical Exam  Vitals and nursing note reviewed  Constitutional:       General: She is not in acute distress  Appearance: Normal appearance  She is not ill-appearing  HENT:      Head: Normocephalic  Eyes:      Conjunctiva/sclera: Conjunctivae normal    Cardiovascular:      Rate and Rhythm: Normal rate and regular rhythm  Pulses: Normal pulses  Carotid pulses are 2+ on the right side and 2+ on the left side  Radial pulses are 2+ on the right side and 2+ on the left side  Posterior tibial pulses are 2+ on the right side and 2+ on the left side  Heart sounds: Normal heart sounds  No murmur heard  Pulmonary:      Effort: Pulmonary effort is normal  No respiratory distress  Breath sounds: Normal breath sounds  No decreased breath sounds, wheezing, rhonchi or rales     Abdominal: " General: Abdomen is flat  Bowel sounds are normal  There is no distension  Palpations: Abdomen is soft  Tenderness: There is abdominal tenderness in the right upper quadrant  There is no right CVA tenderness, left CVA tenderness or guarding  Negative signs include Gore's sign  Musculoskeletal:         General: Normal range of motion  Cervical back: Normal range of motion  Right lower leg: No edema  Left lower leg: No edema  Comments: No pain was noted with palpation of left mid back area where patient reports paresthesias  No rashes, erythema, or other skin changes were noted in the area  Skin:     General: Skin is warm and dry  Capillary Refill: Capillary refill takes less than 2 seconds  Neurological:      General: No focal deficit present  Mental Status: She is alert and oriented to person, place, and time  Psychiatric:         Mood and Affect: Mood normal          Behavior: Behavior normal          Thought Content:  Thought content normal          Judgment: Judgment normal        FANTA Coulter

## 2023-03-31 NOTE — ASSESSMENT & PLAN NOTE
Lipid panel was ordered to be completed prior to next office visit to assess the status of this  Patient was advised to continue to limit fatty and fried foods in her diet

## 2023-03-31 NOTE — TELEPHONE ENCOUNTER
Upon review of the In Basket request we have noted this is a NON VALUE BASED item  We are unable to complete this request  Our team has the capability to assist in retrieval, updating, and linking of the following items: Chlamydia, CRC Cologuard, CRC Colonoscopy, CRC CT Colonography, CRC FIT/FOBT(3), CRC Sigmoidoscopy, CT Lung Screening, DEXA Scan, Diabetic Eye Exam, Diabetic Foot Exam, Hemoglobin A1c, Hepatitis C, HIV (cannot retrieve), Immunizations, Lead, Mammogram, Medicare AWV, Pap Smear (HPV),  and Urine Microalbumin  Any additional questions or concerns should be emailed to the Practice Liaisons via the appropriate education email address, please do not reply via In Basket      Thank you  Elisha Thomas

## 2023-03-31 NOTE — TELEPHONE ENCOUNTER
----- Message from Laura King sent at 3/31/2023  9:57 AM EDT -----  Regarding: care gap request  03/31/23 9:57 AM    Hello, our patient attached above has had HIV completed/performed  Please assist in updating the patient chart by pulling the document from the [facility]  The date of service is 2/4/22       Thank you,  Laura King  [facility]

## 2023-03-31 NOTE — ASSESSMENT & PLAN NOTE
Patient's symptoms seem most consistent with possible prior herpes zoster infection which never manifested itself as a rash  I would like to trial the patient on gabapentin as needed to see if this helps to improve her paresthesias

## 2023-04-01 LAB — BACTERIA UR CULT: NORMAL

## 2023-08-22 ENCOUNTER — VBI (OUTPATIENT)
Dept: ADMINISTRATIVE | Facility: OTHER | Age: 58
End: 2023-08-22

## 2023-10-03 ENCOUNTER — RA CDI HCC (OUTPATIENT)
Dept: OTHER | Facility: HOSPITAL | Age: 58
End: 2023-10-03

## 2023-10-03 NOTE — PROGRESS NOTES
[address] [facility] coding opportunities       Chart reviewed, no opportunity found: CHART REVIEWED, NO OPPORTUNITY FOUND        Patients Insurance        Commercial Insurance: [employer]

## 2023-10-16 ENCOUNTER — OFFICE VISIT (OUTPATIENT)
Dept: FAMILY MEDICINE CLINIC | Facility: CLINIC | Age: 58
End: 2023-10-16
Payer: COMMERCIAL

## 2023-10-16 VITALS
WEIGHT: 146 LBS | SYSTOLIC BLOOD PRESSURE: 158 MMHG | BODY MASS INDEX: 24.32 KG/M2 | HEIGHT: 65 IN | OXYGEN SATURATION: 96 % | RESPIRATION RATE: 18 BRPM | HEART RATE: 98 BPM | DIASTOLIC BLOOD PRESSURE: 80 MMHG

## 2023-10-16 DIAGNOSIS — R09.81 SINUS CONGESTION: ICD-10-CM

## 2023-10-16 DIAGNOSIS — E78.2 MIXED HYPERLIPIDEMIA: Primary | ICD-10-CM

## 2023-10-16 DIAGNOSIS — Z12.4 SCREENING FOR CERVICAL CANCER: ICD-10-CM

## 2023-10-16 DIAGNOSIS — Z11.4 SCREENING FOR HIV (HUMAN IMMUNODEFICIENCY VIRUS): ICD-10-CM

## 2023-10-16 DIAGNOSIS — R07.89 CHEST TIGHTNESS: ICD-10-CM

## 2023-10-16 DIAGNOSIS — K21.9 GASTROESOPHAGEAL REFLUX DISEASE, UNSPECIFIED WHETHER ESOPHAGITIS PRESENT: ICD-10-CM

## 2023-10-16 DIAGNOSIS — Z12.11 SCREENING FOR COLON CANCER: ICD-10-CM

## 2023-10-16 DIAGNOSIS — R79.89 ABNORMAL TSH: ICD-10-CM

## 2023-10-16 DIAGNOSIS — R31.21 ASYMPTOMATIC MICROSCOPIC HEMATURIA: ICD-10-CM

## 2023-10-16 DIAGNOSIS — J30.9 ALLERGIC RHINITIS, UNSPECIFIED SEASONALITY, UNSPECIFIED TRIGGER: ICD-10-CM

## 2023-10-16 DIAGNOSIS — I49.3 PREMATURE VENTRICULAR CONTRACTIONS (PVCS) (VPCS): ICD-10-CM

## 2023-10-16 DIAGNOSIS — Z72.0 TOBACCO ABUSE: ICD-10-CM

## 2023-10-16 DIAGNOSIS — Z12.31 SCREENING MAMMOGRAM FOR BREAST CANCER: ICD-10-CM

## 2023-10-16 DIAGNOSIS — Z00.00 HEALTHCARE MAINTENANCE: ICD-10-CM

## 2023-10-16 DIAGNOSIS — R73.9 HYPERGLYCEMIA: ICD-10-CM

## 2023-10-16 DIAGNOSIS — I10 PRIMARY HYPERTENSION: ICD-10-CM

## 2023-10-16 DIAGNOSIS — Z11.59 NEED FOR HEPATITIS C SCREENING TEST: ICD-10-CM

## 2023-10-16 PROBLEM — R10.12 LUQ PAIN: Status: RESOLVED | Noted: 2023-03-31 | Resolved: 2023-10-16

## 2023-10-16 PROBLEM — R20.2 PARESTHESIAS: Status: RESOLVED | Noted: 2023-03-31 | Resolved: 2023-10-16

## 2023-10-16 PROCEDURE — 93000 ELECTROCARDIOGRAM COMPLETE: CPT | Performed by: FAMILY MEDICINE

## 2023-10-16 PROCEDURE — 99215 OFFICE O/P EST HI 40 MIN: CPT | Performed by: FAMILY MEDICINE

## 2023-10-16 RX ORDER — OMEPRAZOLE 20 MG/1
20 CAPSULE, DELAYED RELEASE ORAL
Qty: 30 CAPSULE | Refills: 5 | Status: SHIPPED | OUTPATIENT
Start: 2023-10-16 | End: 2024-04-13

## 2023-10-16 RX ORDER — AMLODIPINE BESYLATE 5 MG/1
5 TABLET ORAL DAILY
Qty: 30 TABLET | Refills: 5 | Status: SHIPPED | OUTPATIENT
Start: 2023-10-16

## 2023-10-16 NOTE — PROGRESS NOTES
Name: Briana Kang      : 1965      MRN: [id number]  Encounter Provider: Montserrat Zarco DO  Encounter Date: 10/16/2023   Encounter department: [address]   #1. Hyperlipidemia blood work ordered  2. Hyperglycemia blood work ordered  3. Abnormal TSH blood work ordered  4. Tobacco abuse, in remission patient has quit tobacco/  5. Screening colon cancer Cologuard was ordered  6. For healthcare maintenance  Screening for hepatitis C and HIV per CDC protocol ordered patient aware  7. PVC  EKG completed today asymptomatic EKG normal  8. GERD, omeprazole 20 mg daily was ordered  9. Hypertension start amlodipine 5 mg daily  10. Sinus congestion  11. Allergic rhinitis  12. Chest tightness  Sinus and chest x-ray ordered  EKG completed  Singular 10 mg daily was ordered  13. Microscopic hematuria was evaluated by urology, UA is ordered  14. Screening breast cancer mammography ordered  15. Screening cervical cancer GYN consultation was ordered  16  Return in 2 weeks sooner if needed      1. Mixed hyperlipidemia  Assessment & Plan:  Blood work ordered    Orders:  -     CBC; Future; Expected date: 10/17/2023  -     Comprehensive metabolic panel; Future; Expected date: 10/17/2023  -     Hemoglobin A1C; Future; Expected date: 10/17/2023  -     Lipid Panel with Direct LDL reflex; Future; Expected date: 10/17/2023  -     TSH, 3rd generation with Free T4 reflex; Future; Expected date: 10/17/2023  -     UA (URINE) with reflex to Scope; Future; Expected date: 10/17/2023    2. Hyperglycemia  Assessment & Plan:  Blood work ordered    Orders:  -     CBC; Future; Expected date: 10/17/2023  -     Comprehensive metabolic panel; Future; Expected date: 10/17/2023  -     Hemoglobin A1C; Future; Expected date: 10/17/2023  -     Lipid Panel with Direct LDL reflex; Future; Expected date: 10/17/2023  -     TSH, 3rd generation with Free T4 reflex;  Future; Expected date: 10/17/2023  -     UA (URINE) with reflex to Scope; Future; Expected date: 10/17/2023    3. Abnormal TSH  Assessment & Plan:  Blood work ordered    Orders:  -     CBC; Future; Expected date: 10/17/2023  -     Comprehensive metabolic panel; Future; Expected date: 10/17/2023  -     Hemoglobin A1C; Future; Expected date: 10/17/2023  -     Lipid Panel with Direct LDL reflex; Future; Expected date: 10/17/2023  -     TSH, 3rd generation with Free T4 reflex; Future; Expected date: 10/17/2023  -     UA (URINE) with reflex to Scope; Future; Expected date: 10/17/2023    4. Tobacco abuse  Assessment & Plan:  Patient quit tobacco 10/1/2022    Orders:  -     CBC; Future; Expected date: 10/17/2023  -     Comprehensive metabolic panel; Future; Expected date: 10/17/2023  -     Hemoglobin A1C; Future; Expected date: 10/17/2023  -     Lipid Panel with Direct LDL reflex; Future; Expected date: 10/17/2023  -     TSH, 3rd generation with Free T4 reflex; Future; Expected date: 10/17/2023  -     UA (URINE) with reflex to Scope; Future; Expected date: 10/17/2023    5. Screening for colon cancer  Assessment & Plan:  Patient wishes Cologuard, ordered    Orders:  -     Cologuard  -     CBC; Future; Expected date: 10/17/2023  -     Comprehensive metabolic panel; Future; Expected date: 10/17/2023  -     Hemoglobin A1C; Future; Expected date: 10/17/2023  -     Lipid Panel with Direct LDL reflex; Future; Expected date: 10/17/2023  -     TSH, 3rd generation with Free T4 reflex; Future; Expected date: 10/17/2023  -     UA (URINE) with reflex to Scope; Future; Expected date: 10/17/2023    6. Healthcare maintenance  Assessment & Plan:  Hepatitis C and HIV screening ordered patient aware CDC protocol    Orders:  -     CBC; Future; Expected date: 10/17/2023  -     Comprehensive metabolic panel; Future; Expected date: 10/17/2023  -     Hemoglobin A1C; Future; Expected date: 10/17/2023  -     Lipid Panel with Direct LDL reflex;  Future; Expected date: 10/17/2023  -     TSH, 3rd generation with Free T4 reflex; Future; Expected date: 10/17/2023  -     UA (URINE) with reflex to Scope; Future; Expected date: 10/17/2023    7. Premature ventricular contractions (PVCs) (VPCs)  Assessment & Plan:  EKG completed    Orders:  -     CBC; Future; Expected date: 10/17/2023  -     Comprehensive metabolic panel; Future; Expected date: 10/17/2023  -     Hemoglobin A1C; Future; Expected date: 10/17/2023  -     Lipid Panel with Direct LDL reflex; Future; Expected date: 10/17/2023  -     TSH, 3rd generation with Free T4 reflex; Future; Expected date: 10/17/2023  -     UA (URINE) with reflex to Scope; Future; Expected date: 10/17/2023    8. Gastroesophageal reflux disease, unspecified whether esophagitis present  Assessment & Plan:  Stable on over-the-counter omeprazole 20 she would like a prescription. Orders:  -     omeprazole (PriLOSEC) 20 mg delayed release capsule; Take 1 capsule (20 mg total) by mouth daily before breakfast  -     CBC; Future; Expected date: 10/17/2023  -     Comprehensive metabolic panel; Future; Expected date: 10/17/2023  -     Hemoglobin A1C; Future; Expected date: 10/17/2023  -     Lipid Panel with Direct LDL reflex; Future; Expected date: 10/17/2023  -     TSH, 3rd generation with Free T4 reflex; Future; Expected date: 10/17/2023  -     UA (URINE) with reflex to Scope; Future; Expected date: 10/17/2023    9. Primary hypertension  Assessment & Plan:  EKG completed, amlodipine 5 mg daily was ordered    Orders:  -     amLODIPine (NORVASC) 5 mg tablet; Take 1 tablet (5 mg total) by mouth daily  -     CBC; Future; Expected date: 10/17/2023  -     Comprehensive metabolic panel; Future; Expected date: 10/17/2023  -     Hemoglobin A1C; Future; Expected date: 10/17/2023  -     Lipid Panel with Direct LDL reflex; Future; Expected date: 10/17/2023  -     TSH, 3rd generation with Free T4 reflex;  Future; Expected date: 10/17/2023  -     UA (URINE) with reflex to Scope; Future; Expected date: 10/17/2023    10. Need for hepatitis C screening test  -     Hepatitis C Antibody; Future; Expected date: 10/17/2023  -     CBC; Future; Expected date: 10/17/2023  -     Comprehensive metabolic panel; Future; Expected date: 10/17/2023  -     Hemoglobin A1C; Future; Expected date: 10/17/2023  -     Lipid Panel with Direct LDL reflex; Future; Expected date: 10/17/2023  -     TSH, 3rd generation with Free T4 reflex; Future; Expected date: 10/17/2023  -     UA (URINE) with reflex to Scope; Future; Expected date: 10/17/2023    11. Screening for HIV (human immunodeficiency virus)  -     HIV 1/2 AG/AB w Reflex [facility] for 2 yr old and above; Future; Expected date: 10/17/2023  -     CBC; Future; Expected date: 10/17/2023  -     Comprehensive metabolic panel; Future; Expected date: 10/17/2023  -     Hemoglobin A1C; Future; Expected date: 10/17/2023  -     Lipid Panel with Direct LDL reflex; Future; Expected date: 10/17/2023  -     TSH, 3rd generation with Free T4 reflex; Future; Expected date: 10/17/2023  -     UA (URINE) with reflex to Scope; Future; Expected date: 10/17/2023    12. Allergic rhinitis, unspecified seasonality, unspecified trigger  Assessment & Plan:  Singulair ordered      13. Sinus congestion  -     XR sinuses routine 3+ views; Future; Expected date: 10/16/2023  -     XR chest pa & lateral; Future; Expected date: 10/16/2023    14. Chest tightness  -     XR sinuses routine 3+ views; Future; Expected date: 10/16/2023  -     XR chest pa & lateral; Future; Expected date: 10/16/2023  -     POCT ECG    15. Asymptomatic microscopic hematuria  Assessment & Plan:  Seen by urology UA ordered      16. Screening mammogram for breast cancer  -     Mammo screening bilateral w cad; Future; Expected date: 10/16/2023    17. Screening for cervical cancer  -     Ambulatory referral to Obstetrics / Gynecology;  Future      Depression Screening and Follow-up Plan: Patient was screened for depression during today's encounter. They screened negative with a PHQ-2 score of 0. Subjective      [place] and had a severe sinus infection. She was given amoxicillin which did not work but then was given Augmentin and she felt terrible GI upset and nausea with this. States she still gets some chest tightness and head congestion at times. No fever chills wheeze or hemoptysis. Patient did quit tobacco 10/1/2022. Review of Systems   Constitutional: Negative. HENT: Negative. Eyes: Negative. Respiratory:          HPI   Cardiovascular:         HPI   Gastrointestinal:         HPI   Endocrine: Negative. Genitourinary: Negative. Musculoskeletal: Negative. Skin: Negative. Allergic/Immunologic: Positive for environmental allergies. Neurological: Negative. Hematological: Negative. Psychiatric/Behavioral: Negative. Current Outpatient Medications on File Prior to Visit   Medication Sig   • Multiple Vitamin (MULTIVITAMIN ADULT PO) Take 1 tablet by mouth daily   • POTASSIUM CHLORIDE PO Take by mouth   • [DISCONTINUED] ascorbic acid (VITAMIN C) 500 MG tablet Take 500 mg by mouth daily   • [DISCONTINUED] dicyclomine (BENTYL) 10 mg capsule Take 1 capsule (10 mg total) by mouth 4 (four) times a day as needed (Abdominal pain or diarrhea.)   • [DISCONTINUED] esomeprazole (NexIUM) 20 mg capsule Take 20 mg by mouth every morning before breakfast   • [DISCONTINUED] gabapentin (Neurontin) 100 mg capsule Take 1 capsule (100 mg total) by mouth 3 (three) times a day as needed (Back pain, numbness, and tingling)       Objective     /80   Pulse 98   Resp 18   Ht 5' 5" (1.651 m)   Wt 66.2 kg (146 lb)   SpO2 96%   BMI 24.30 kg/m²     Physical Exam  Vitals and nursing note reviewed. Constitutional:       General: She is not in acute distress. Appearance: Normal appearance. She is not ill-appearing, toxic-appearing or diaphoretic. HENT:      Head: Normocephalic and atraumatic. Right Ear: Tympanic membrane normal.      Left Ear: Tympanic membrane normal.      Nose:      Comments: Positive allergic turbinates negative sinus tenderness to percussion     Mouth/Throat:      Mouth: Mucous membranes are moist.      Pharynx: Oropharynx is clear. No oropharyngeal exudate or posterior oropharyngeal erythema. Eyes:      General: No scleral icterus. Neck:      Vascular: No carotid bruit. Cardiovascular:      Rate and Rhythm: Normal rate and regular rhythm. Heart sounds: Normal heart sounds. Pulmonary:      Effort: Pulmonary effort is normal.      Breath sounds: Normal breath sounds. Abdominal:      General: Bowel sounds are normal.      Tenderness: There is no abdominal tenderness. Musculoskeletal:      Cervical back: Neck supple. No rigidity or tenderness. Right lower leg: No edema. Left lower leg: No edema. Lymphadenopathy:      Cervical: No cervical adenopathy. Skin:     General: Skin is warm and dry. Neurological:      General: No focal deficit present. Mental Status: She is alert.    Psychiatric:         Mood and Affect: Mood normal.       Trip Rousseau DO

## 2023-10-16 NOTE — PATIENT INSTRUCTIONS
Complete sinus and chest x-ray  Start Singulair 10 mg daily for head and chest congestion  Complete blood work as ordered  Complete Cologuard for colon cancer screening  Complete mammography for breast cancer  Follow-up with GYN for cervical cancer screening  Start amlodipine 5 mg daily for your blood pressure/hypertension  Return in 2 weeks sooner if needed

## 2023-11-03 ENCOUNTER — APPOINTMENT (OUTPATIENT)
Dept: LAB | Facility: CLINIC | Age: 58
End: 2023-11-03
Payer: COMMERCIAL

## 2023-11-03 DIAGNOSIS — Z00.00 HEALTHCARE MAINTENANCE: ICD-10-CM

## 2023-11-03 DIAGNOSIS — K21.9 GASTROESOPHAGEAL REFLUX DISEASE, UNSPECIFIED WHETHER ESOPHAGITIS PRESENT: ICD-10-CM

## 2023-11-03 DIAGNOSIS — R73.9 HYPERGLYCEMIA: ICD-10-CM

## 2023-11-03 DIAGNOSIS — R79.89 ABNORMAL TSH: ICD-10-CM

## 2023-11-03 DIAGNOSIS — I49.3 PREMATURE VENTRICULAR CONTRACTIONS (PVCS) (VPCS): ICD-10-CM

## 2023-11-03 DIAGNOSIS — Z12.11 SCREENING FOR COLON CANCER: ICD-10-CM

## 2023-11-03 DIAGNOSIS — E78.2 MIXED HYPERLIPIDEMIA: ICD-10-CM

## 2023-11-03 DIAGNOSIS — Z72.0 TOBACCO ABUSE: ICD-10-CM

## 2023-11-03 DIAGNOSIS — I10 PRIMARY HYPERTENSION: ICD-10-CM

## 2023-11-03 DIAGNOSIS — Z11.4 SCREENING FOR HIV (HUMAN IMMUNODEFICIENCY VIRUS): ICD-10-CM

## 2023-11-03 DIAGNOSIS — Z11.59 NEED FOR HEPATITIS C SCREENING TEST: ICD-10-CM

## 2023-11-03 LAB
ALBUMIN SERPL BCP-MCNC: 4.5 G/DL (ref 3.5–5)
ALP SERPL-CCNC: 72 U/L (ref 34–104)
ALT SERPL W P-5'-P-CCNC: 25 U/L (ref 7–52)
ANION GAP SERPL CALCULATED.3IONS-SCNC: 8 MMOL/L
AST SERPL W P-5'-P-CCNC: 23 U/L (ref 13–39)
BACTERIA UR QL AUTO: ABNORMAL /HPF
BILIRUB SERPL-MCNC: 0.47 MG/DL (ref 0.2–1)
BILIRUB UR QL STRIP: NEGATIVE
BUN SERPL-MCNC: 21 MG/DL (ref 5–25)
CALCIUM SERPL-MCNC: 9.4 MG/DL (ref 8.4–10.2)
CHLORIDE SERPL-SCNC: 102 MMOL/L (ref 96–108)
CHOLEST SERPL-MCNC: 266 MG/DL
CLARITY UR: CLEAR
CO2 SERPL-SCNC: 29 MMOL/L (ref 21–32)
COLOR UR: ABNORMAL
CREAT SERPL-MCNC: 0.7 MG/DL (ref 0.6–1.3)
ERYTHROCYTE [DISTWIDTH] IN BLOOD BY AUTOMATED COUNT: 12.5 % (ref 11.6–15.1)
EST. AVERAGE GLUCOSE BLD GHB EST-MCNC: 105 MG/DL
GFR SERPL CREATININE-BSD FRML MDRD: 96 ML/MIN/1.73SQ M
GLUCOSE P FAST SERPL-MCNC: 97 MG/DL (ref 65–99)
GLUCOSE UR STRIP-MCNC: NEGATIVE MG/DL
HBA1C MFR BLD: 5.3 %
HCT VFR BLD AUTO: 39.2 % (ref 34.8–46.1)
HCV AB SER QL: NORMAL
HDLC SERPL-MCNC: 51 MG/DL
HGB BLD-MCNC: 13.3 G/DL (ref 11.5–15.4)
HGB UR QL STRIP.AUTO: NEGATIVE
HIV 1+2 AB+HIV1 P24 AG SERPL QL IA: NORMAL
HIV 2 AB SERPL QL IA: NORMAL
HIV1 AB SERPL QL IA: NORMAL
HIV1 P24 AG SERPL QL IA: NORMAL
KETONES UR STRIP-MCNC: NEGATIVE MG/DL
LDLC SERPL CALC-MCNC: 160 MG/DL (ref 0–100)
LEUKOCYTE ESTERASE UR QL STRIP: ABNORMAL
MCH RBC QN AUTO: 31.9 PG (ref 26.8–34.3)
MCHC RBC AUTO-ENTMCNC: 33.9 G/DL (ref 31.4–37.4)
MCV RBC AUTO: 94 FL (ref 82–98)
NITRITE UR QL STRIP: NEGATIVE
NON-SQ EPI CELLS URNS QL MICRO: ABNORMAL /HPF
PH UR STRIP.AUTO: 5.5 [PH]
PLATELET # BLD AUTO: 355 THOUSANDS/UL (ref 149–390)
PMV BLD AUTO: 9.9 FL (ref 8.9–12.7)
POTASSIUM SERPL-SCNC: 4.4 MMOL/L (ref 3.5–5.3)
PROT SERPL-MCNC: 7.6 G/DL (ref 6.4–8.4)
PROT UR STRIP-MCNC: NEGATIVE MG/DL
RBC # BLD AUTO: 4.17 MILLION/UL (ref 3.81–5.12)
RBC #/AREA URNS AUTO: ABNORMAL /HPF
SODIUM SERPL-SCNC: 139 MMOL/L (ref 135–147)
SP GR UR STRIP.AUTO: 1.01 (ref 1–1.03)
TRIGL SERPL-MCNC: 274 MG/DL
TSH SERPL DL<=0.05 MIU/L-ACNC: 1.11 UIU/ML (ref 0.45–4.5)
UROBILINOGEN UR STRIP-ACNC: <2 MG/DL
WBC # BLD AUTO: 7.55 THOUSAND/UL (ref 4.31–10.16)
WBC #/AREA URNS AUTO: ABNORMAL /HPF

## 2023-11-03 PROCEDURE — 80053 COMPREHEN METABOLIC PANEL: CPT

## 2023-11-03 PROCEDURE — 80061 LIPID PANEL: CPT

## 2023-11-03 PROCEDURE — 85027 COMPLETE CBC AUTOMATED: CPT

## 2023-11-03 PROCEDURE — 83036 HEMOGLOBIN GLYCOSYLATED A1C: CPT

## 2023-11-03 PROCEDURE — 87389 HIV-1 AG W/HIV-1&-2 AB AG IA: CPT

## 2023-11-03 PROCEDURE — 84443 ASSAY THYROID STIM HORMONE: CPT

## 2023-11-03 PROCEDURE — 81001 URINALYSIS AUTO W/SCOPE: CPT

## 2023-11-03 PROCEDURE — 86803 HEPATITIS C AB TEST: CPT

## 2023-11-03 PROCEDURE — 36415 COLL VENOUS BLD VENIPUNCTURE: CPT

## 2023-11-11 DIAGNOSIS — I10 PRIMARY HYPERTENSION: ICD-10-CM

## 2023-11-11 DIAGNOSIS — K21.9 GASTROESOPHAGEAL REFLUX DISEASE, UNSPECIFIED WHETHER ESOPHAGITIS PRESENT: ICD-10-CM

## 2023-11-13 RX ORDER — OMEPRAZOLE 20 MG/1
20 CAPSULE, DELAYED RELEASE ORAL
Qty: 90 CAPSULE | Refills: 1 | Status: SHIPPED | OUTPATIENT
Start: 2023-11-13

## 2023-11-13 RX ORDER — AMLODIPINE BESYLATE 5 MG/1
5 TABLET ORAL DAILY
Qty: 90 TABLET | Refills: 1 | Status: SHIPPED | OUTPATIENT
Start: 2023-11-13 | End: 2023-11-17

## 2023-11-17 ENCOUNTER — OFFICE VISIT (OUTPATIENT)
Dept: FAMILY MEDICINE CLINIC | Facility: CLINIC | Age: 58
End: 2023-11-17
Payer: COMMERCIAL

## 2023-11-17 VITALS
SYSTOLIC BLOOD PRESSURE: 132 MMHG | HEART RATE: 76 BPM | HEIGHT: 65 IN | TEMPERATURE: 97.9 F | BODY MASS INDEX: 24.62 KG/M2 | WEIGHT: 147.8 LBS | DIASTOLIC BLOOD PRESSURE: 70 MMHG

## 2023-11-17 DIAGNOSIS — R79.89 ABNORMAL TSH: ICD-10-CM

## 2023-11-17 DIAGNOSIS — K21.9 GASTROESOPHAGEAL REFLUX DISEASE, UNSPECIFIED WHETHER ESOPHAGITIS PRESENT: ICD-10-CM

## 2023-11-17 DIAGNOSIS — R73.9 HYPERGLYCEMIA: ICD-10-CM

## 2023-11-17 DIAGNOSIS — I49.3 PREMATURE VENTRICULAR CONTRACTIONS (PVCS) (VPCS): ICD-10-CM

## 2023-11-17 DIAGNOSIS — L30.9 FACIAL DERMATITIS: ICD-10-CM

## 2023-11-17 DIAGNOSIS — Z12.11 SCREENING FOR COLON CANCER: ICD-10-CM

## 2023-11-17 DIAGNOSIS — R31.21 ASYMPTOMATIC MICROSCOPIC HEMATURIA: ICD-10-CM

## 2023-11-17 DIAGNOSIS — R19.5 POSITIVE COLORECTAL CANCER SCREENING USING COLOGUARD TEST: Primary | ICD-10-CM

## 2023-11-17 DIAGNOSIS — I10 PRIMARY HYPERTENSION: ICD-10-CM

## 2023-11-17 DIAGNOSIS — J30.9 ALLERGIC RHINITIS, UNSPECIFIED SEASONALITY, UNSPECIFIED TRIGGER: ICD-10-CM

## 2023-11-17 DIAGNOSIS — E78.2 MIXED HYPERLIPIDEMIA: ICD-10-CM

## 2023-11-17 LAB — COLOGUARD RESULT REPORTABLE: POSITIVE

## 2023-11-17 PROCEDURE — 99214 OFFICE O/P EST MOD 30 MIN: CPT | Performed by: FAMILY MEDICINE

## 2023-11-17 RX ORDER — AMLODIPINE BESYLATE AND ATORVASTATIN CALCIUM 5; 20 MG/1; MG/1
1 TABLET, FILM COATED ORAL DAILY
Qty: 30 TABLET | Refills: 5 | Status: SHIPPED | OUTPATIENT
Start: 2023-11-17

## 2023-11-17 NOTE — PROGRESS NOTES
Name: Devendra Carpio      : 1965      MRN: [id number]  Encounter Provider: Rene Shipman DO  Encounter Date: 2023   Encounter department: [address]   #1. Positive Cologuard/colon cancer screening refer to gastroenterology patient aware she will need colonoscopy to rule out colon cancer  2.  hypertension, stable  3. Hyperlipidemia, favor familial  We will discontinue amlodipine and start Caduet 5/20 with amlodipine 5 mg to continue and atorvastatin 20 mg  4. Hyperglycemia diet controlled  5. Abnormal TSH, normal  6. Microhematuria, UA was clear patient did see urology in the past  7. Allergic rhinitis, stable patient has discontinued Singulair  May. GERD, stable on omeprazole  Benign. PVC, asymptomatic  10. Facial dermatitis refer to dermatology, Dr. Venice Flores  11. Patient to return in 3 months for office visit and blood work sooner if needed        1. Positive colorectal cancer screening using Cologuard test  -     Ambulatory Referral to Gastroenterology; Future    2. Primary hypertension  Assessment & Plan:  Stable continue amlodipine 5 mg daily however will change to Caduet 5/20 secondary to cholesterol    Orders:  -     amLODIPine-atorvastatin (CADUET) 5-20 MG per tablet; Take 1 tablet by mouth daily  -     Comprehensive metabolic panel; Future; Expected date: 2024  -     Lipid Panel with Direct LDL reflex; Future; Expected date: 2024    3. Mixed hyperlipidemia  Assessment & Plan:  I believe familial component we will start CAD await with 20 mg of atorvastatin    Orders:  -     amLODIPine-atorvastatin (CADUET) 5-20 MG per tablet; Take 1 tablet by mouth daily  -     Comprehensive metabolic panel; Future; Expected date: 2024  -     Lipid Panel with Direct LDL reflex; Future; Expected date: 2024    4. Screening for colon cancer  Assessment & Plan:  Cologuard was positive refer to gastroenterology      5. Hyperglycemia  Assessment & Plan:  Diet controlled      6. Abnormal TSH  Assessment & Plan:  TSH is now normal      7. Asymptomatic microscopic hematuria  Assessment & Plan:  Follows with urology UA is clear at the present time      8. Allergic rhinitis, unspecified seasonality, unspecified trigger  Assessment & Plan:  Doing well has discontinued Singulair      9. Gastroesophageal reflux disease, unspecified whether esophagitis present  Assessment & Plan:  Stable on omeprazole 20 mg daily      10. Premature ventricular contractions (PVCs) (VPCs)  Assessment & Plan:  Asymptomatic at the present time      11. Facial dermatitis  -     Ambulatory Referral to Dermatology; Future           Subjective      Patient doing well. Blood work was reviewed. Patient concerned about an area on her forehead very small cystic lesions has tried exfoliate without relief      Review of Systems   Constitutional: Negative. HENT: Negative. Eyes: Negative. Respiratory: Negative. Cardiovascular: Negative. Gastrointestinal: Negative. Endocrine: Negative. Genitourinary: Negative. Musculoskeletal: Negative. Skin:         HPI   Allergic/Immunologic: Negative. Neurological: Negative. Hematological: Negative. Psychiatric/Behavioral: Negative. Current Outpatient Medications on File Prior to Visit   Medication Sig   • Multiple Vitamin (MULTIVITAMIN ADULT PO) Take 1 tablet by mouth daily   • omeprazole (PriLOSEC) 20 mg delayed release capsule TAKE 1 CAPSULE BY MOUTH DAILY BEFORE BREAKFAST. • POTASSIUM CHLORIDE PO Take by mouth   • [DISCONTINUED] amLODIPine (NORVASC) 5 mg tablet TAKE 1 TABLET (5 MG TOTAL) BY MOUTH DAILY. Objective     /70   Pulse 76   Temp 97.9 °F (36.6 °C) (Temporal)   Ht 5' 5" (1.651 m) Comment: on file  Wt 67 kg (147 lb 12.8 oz)   BMI 24.60 kg/m²     Physical Exam  Vitals and nursing note reviewed. Constitutional:       Appearance: Normal appearance.    HENT: Head: Normocephalic and atraumatic. Right Ear: Tympanic membrane normal.      Left Ear: Tympanic membrane normal.      Nose: Nose normal.      Mouth/Throat:      Mouth: Mucous membranes are moist.      Pharynx: Oropharynx is clear. No oropharyngeal exudate or posterior oropharyngeal erythema. Eyes:      General: No scleral icterus. Neck:      Vascular: No carotid bruit. Cardiovascular:      Rate and Rhythm: Normal rate and regular rhythm. Heart sounds: Normal heart sounds. Pulmonary:      Effort: Pulmonary effort is normal.      Breath sounds: Normal breath sounds. Abdominal:      General: Bowel sounds are normal.      Palpations: Abdomen is soft. Tenderness: There is no abdominal tenderness. Musculoskeletal:      Cervical back: Neck supple. Right lower leg: No edema. Left lower leg: No edema. Skin:     General: Skin is warm and dry. Findings: Rash present. Comments: Central inferior forehead with approximately 1 x 1 cm area very small cystic lesions question milia   Neurological:      General: No focal deficit present. Mental Status: She is alert.    Psychiatric:         Mood and Affect: Mood normal.       Trip Rousseau DO

## 2023-11-17 NOTE — PATIENT INSTRUCTIONS
Follow with gastroenterology.   You will need a colonoscopy because of a positive Cologuard screen for colon cancer  Discontinue plain amlodipine  Change to CAD awake, amlodipine/atorvastatin, 5/20, which includes amlodipine 5 mg and atorvastatin 20 mg for blood pressure and cholesterol control  Follow with dermatology for facial dermatitis  Return in 3 months for office visit and blood work sooner if needed

## 2023-11-21 ENCOUNTER — TELEPHONE (OUTPATIENT)
Dept: OBGYN CLINIC | Facility: CLINIC | Age: 58
End: 2023-11-21

## 2023-11-21 NOTE — TELEPHONE ENCOUNTER
I attempted to contact the patient regarding her missed appointment today and left a message to inquire about the possibility of rescheduling.

## 2023-11-27 ENCOUNTER — VBI (OUTPATIENT)
Dept: ADMINISTRATIVE | Facility: OTHER | Age: 58
End: 2023-11-27

## 2023-11-28 ENCOUNTER — PREP FOR PROCEDURE (OUTPATIENT)
Age: 58
End: 2023-11-28

## 2023-11-28 ENCOUNTER — TELEPHONE (OUTPATIENT)
Age: 58
End: 2023-11-28

## 2023-11-28 DIAGNOSIS — Z12.11 SCREENING FOR COLON CANCER: Primary | ICD-10-CM

## 2023-11-28 NOTE — TELEPHONE ENCOUNTER
11/28/23  Screened by: Marily Montoya    Referring Provider Rene Shipman     Pre- Screening: There is no height or weight on file to calculate BMI. Has patient been referred for a routine screening Colonoscopy? yes  Is the patient between 43-73 years old? yes      Previous Colonoscopy no   If yes:    Date:     Facility:     Reason:       SCHEDULING STAFF: If the patient is between 45yrs-49yrs, please advise patient to confirm benefits/coverage with their insurance company for a routine screening colonoscopy, some insurance carriers will only cover at [address] [place] [address] or older. If the patient is over 66years old, please schedule an office visit. Does the patient want to see a Gastroenterologist prior to their procedure OR are they having any GI symptoms? no    Has the patient been hospitalized or had abdominal surgery in the past 6 months? no    Does the patient use supplemental oxygen? no    Does the patient take Coumadin, Lovenox, Plavix, Elliquis, Xarelto, or other blood thinning medication? no    Has the patient had a stroke, cardiac event, or stent placed in the past year? no    SCHEDULING STAFF: If patient answers NO to above questions, then schedule procedure. If patient answers YES to above questions, then schedule office appointment. Pt passed OA    If patient is between 45yrs - 49yrs, please advise patient that we will have to confirm benefits & coverage with their insurance company for a routine screening colonoscopy.

## 2023-11-28 NOTE — TELEPHONE ENCOUNTER
Scheduled date of colonoscopy (as of today):1/12/24    Physician performing colonoscopy:Dr Delaney    Location of colonoscopy:[place]    Bowel prep reviewed with patient:miralax/dulcolax    Instructions reviewed with patient by:prep instructions sent via [employer]    Clearances: N/A

## 2023-12-05 ENCOUNTER — VBI (OUTPATIENT)
Dept: ADMINISTRATIVE | Facility: OTHER | Age: 58
End: 2023-12-05

## 2023-12-13 DIAGNOSIS — I10 PRIMARY HYPERTENSION: ICD-10-CM

## 2023-12-13 DIAGNOSIS — E78.2 MIXED HYPERLIPIDEMIA: ICD-10-CM

## 2023-12-13 RX ORDER — AMLODIPINE BESYLATE AND ATORVASTATIN CALCIUM 5; 20 MG/1; MG/1
1 TABLET, FILM COATED ORAL DAILY
Qty: 90 TABLET | Refills: 1 | Status: SHIPPED | OUTPATIENT
Start: 2023-12-13

## 2023-12-15 PROBLEM — Z00.00 HEALTHCARE MAINTENANCE: Status: RESOLVED | Noted: 2023-10-16 | Resolved: 2023-12-15

## 2023-12-15 PROBLEM — Z12.11 SCREENING FOR COLON CANCER: Status: RESOLVED | Noted: 2023-10-16 | Resolved: 2023-12-15

## 2023-12-20 ENCOUNTER — VBI (OUTPATIENT)
Dept: ADMINISTRATIVE | Facility: OTHER | Age: 58
End: 2023-12-20

## 2024-01-02 ENCOUNTER — ANESTHESIA (OUTPATIENT)
Dept: ANESTHESIOLOGY | Facility: HOSPITAL | Age: 59
End: 2024-01-02

## 2024-01-02 ENCOUNTER — ANESTHESIA EVENT (OUTPATIENT)
Dept: ANESTHESIOLOGY | Facility: HOSPITAL | Age: 59
End: 2024-01-02

## 2024-01-02 ENCOUNTER — TELEPHONE (OUTPATIENT)
Dept: GASTROENTEROLOGY | Facility: CLINIC | Age: 59
End: 2024-01-02

## 2024-01-02 NOTE — TELEPHONE ENCOUNTER
Left voicemail and requested call back     Confirming Upcoming Procedure: colon on Jan 12  Physician performing: Dr. Delaney  Location of procedure:  [place]  [facility]

## 2024-01-11 RX ORDER — SODIUM CHLORIDE 9 MG/ML
125 INJECTION, SOLUTION INTRAVENOUS CONTINUOUS
Status: CANCELLED | OUTPATIENT
Start: 2024-01-11

## 2024-01-12 ENCOUNTER — HOSPITAL ENCOUNTER (OUTPATIENT)
Dept: GASTROENTEROLOGY | Facility: MEDICAL CENTER | Age: 59
Setting detail: OUTPATIENT SURGERY
End: 2024-01-12
Attending: INTERNAL MEDICINE
Payer: COMMERCIAL

## 2024-01-12 ENCOUNTER — ANESTHESIA EVENT (OUTPATIENT)
Dept: GASTROENTEROLOGY | Facility: MEDICAL CENTER | Age: 59
End: 2024-01-12

## 2024-01-12 ENCOUNTER — ANESTHESIA (OUTPATIENT)
Dept: GASTROENTEROLOGY | Facility: MEDICAL CENTER | Age: 59
End: 2024-01-12

## 2024-01-12 VITALS
HEIGHT: 65 IN | HEART RATE: 80 BPM | BODY MASS INDEX: 24.16 KG/M2 | RESPIRATION RATE: 16 BRPM | SYSTOLIC BLOOD PRESSURE: 110 MMHG | DIASTOLIC BLOOD PRESSURE: 66 MMHG | TEMPERATURE: 97.8 F | OXYGEN SATURATION: 98 % | WEIGHT: 145 LBS

## 2024-01-12 DIAGNOSIS — Z12.11 SCREENING FOR COLON CANCER: ICD-10-CM

## 2024-01-12 DIAGNOSIS — R19.5 POSITIVE COLORECTAL CANCER SCREENING USING COLOGUARD TEST: ICD-10-CM

## 2024-01-12 PROCEDURE — 45380 COLONOSCOPY AND BIOPSY: CPT | Performed by: INTERNAL MEDICINE

## 2024-01-12 PROCEDURE — 88305 TISSUE EXAM BY PATHOLOGIST: CPT | Performed by: PATHOLOGY

## 2024-01-12 RX ORDER — LIDOCAINE HYDROCHLORIDE 20 MG/ML
INJECTION, SOLUTION EPIDURAL; INFILTRATION; INTRACAUDAL; PERINEURAL AS NEEDED
Status: DISCONTINUED | OUTPATIENT
Start: 2024-01-12 | End: 2024-01-12

## 2024-01-12 RX ORDER — SODIUM CHLORIDE 9 MG/ML
125 INJECTION, SOLUTION INTRAVENOUS CONTINUOUS
Status: DISCONTINUED | OUTPATIENT
Start: 2024-01-12 | End: 2024-01-16 | Stop reason: HOSPADM

## 2024-01-12 RX ORDER — PROPOFOL 10 MG/ML
INJECTION, EMULSION INTRAVENOUS AS NEEDED
Status: DISCONTINUED | OUTPATIENT
Start: 2024-01-12 | End: 2024-01-12

## 2024-01-12 RX ADMIN — PROPOFOL 40 MG: 10 INJECTION, EMULSION INTRAVENOUS at 09:31

## 2024-01-12 RX ADMIN — PROPOFOL 40 MG: 10 INJECTION, EMULSION INTRAVENOUS at 09:37

## 2024-01-12 RX ADMIN — PROPOFOL 40 MG: 10 INJECTION, EMULSION INTRAVENOUS at 09:33

## 2024-01-12 RX ADMIN — LIDOCAINE HYDROCHLORIDE 100 MG: 20 INJECTION, SOLUTION EPIDURAL; INFILTRATION; INTRACAUDAL at 09:30

## 2024-01-12 RX ADMIN — SODIUM CHLORIDE 125 ML/HR: 0.9 INJECTION, SOLUTION INTRAVENOUS at 09:21

## 2024-01-12 RX ADMIN — PROPOFOL 40 MG: 10 INJECTION, EMULSION INTRAVENOUS at 09:35

## 2024-01-12 RX ADMIN — PROPOFOL 40 MG: 10 INJECTION, EMULSION INTRAVENOUS at 09:39

## 2024-01-12 RX ADMIN — PROPOFOL 110 MG: 10 INJECTION, EMULSION INTRAVENOUS at 09:30

## 2024-01-12 RX ADMIN — PROPOFOL 30 MG: 10 INJECTION, EMULSION INTRAVENOUS at 09:44

## 2024-01-12 RX ADMIN — PROPOFOL 40 MG: 10 INJECTION, EMULSION INTRAVENOUS at 09:32

## 2024-01-12 RX ADMIN — PROPOFOL 40 MG: 10 INJECTION, EMULSION INTRAVENOUS at 09:42

## 2024-01-12 NOTE — ANESTHESIA POSTPROCEDURE EVALUATION
"Post-Op Assessment Note    CV Status:  Stable    Pain management: adequate       Mental Status:  Alert and awake   Hydration Status:  Euvolemic   PONV Controlled:  Controlled   Airway Patency:  Patent     Post Op Vitals Reviewed: Yes      Staff: Anesthesiologist               BP      Temp      Pulse     Resp      SpO2      /66   Pulse 80   Temp 97.8 °F (36.6 °C) (Temporal)   Resp 16   Ht 5' 5\" (1.651 m)   Wt 65.8 kg (145 lb)   SpO2 98%   BMI 24.13 kg/m²     "

## 2024-01-12 NOTE — H&P
History and Physical -  [facility]  Justine Rousseau 58 y.o. female MRN: [id number]                  HPI: Justine Rousseau is a 58 y.o. year old female who presents for + Cologuard      REVIEW OF SYSTEMS: Per the HPI, and otherwise unremarkable.    Historical Information   No past medical history on file.  Past Surgical History:   Procedure Laterality Date    OVARIAN CYST SURGERY       Social History   Social History     Substance and Sexual Activity   Alcohol Use Yes    Alcohol/week: 6.0 standard drinks of alcohol    Types: 6 Cans of beer per week     Social History     Substance and Sexual Activity   Drug Use Never     Social History     Tobacco Use   Smoking Status Former    Current packs/day: 0.00    Average packs/day: 0.3 packs/day for 5.0 years (1.3 ttl pk-yrs)    Types: Cigarettes    Start date: 10/1/2017    Quit date: 10/1/2022    Years since quittin.2   Smokeless Tobacco Never     Family History   Problem Relation Age of Onset    No Known Problems Mother     Cancer Father     Stroke Father     Heart disease Maternal Uncle        Meds/Allergies     (Not in a hospital admission)      Allergies   Allergen Reactions    Augmentin [Amoxicillin-Pot Clavulanate] GI Intolerance    Tramadol Vomiting       Objective     There were no vitals taken for this visit.      PHYSICAL EXAMINATION:    General Appearance:   Alert, cooperative, no distress   HEENT:  Normocephalic, atraumatic, anicteric. Neck supple, symmetrical, trachea midline.   Lungs:   Equal chest rise and unlabored breathing, normal effort, no coughing.   Cardiovascular:   No visualized JVD.   Abdomen:   No abdominal distension.   Skin:   No jaundice, rashes, or lesions.    Musculoskeletal:   Normal range of motion visualized.   Psych:  Normal affect and normal insight.   Neuro:  Alert and appropriate.           ASSESSMENT/PLAN:  This is a 58 y.o. year old female here for colonoscopy, and she is stable and optimized for her  procedure.

## 2024-01-12 NOTE — ANESTHESIA PREPROCEDURE EVALUATION
Procedure:  COLONOSCOPY    Relevant Problems   CARDIO   (+) Mixed hyperlipidemia   (+) Premature ventricular contractions (PVCs) (VPCs)   (+) Primary hypertension      GI/HEPATIC   (+) GERD (gastroesophageal reflux disease)        Physical Exam    Airway    Mallampati score: I  TM Distance: >3 FB  Neck ROM: full     Dental       Cardiovascular  Rhythm: regular    Pulmonary   Breath sounds clear to auscultation    Other Findings  post-pubertal.      Anesthesia Plan  ASA Score- 2     Anesthesia Type- IV sedation with anesthesia with ASA Monitors.         Additional Monitors:     Airway Plan:            Plan Factors-Exercise tolerance (METS): >4 METS.    Chart reviewed.   Existing labs reviewed.                   Induction- intravenous.    Postoperative Plan-     Informed Consent- Anesthetic plan and risks discussed with patient.

## 2024-01-16 PROCEDURE — 88305 TISSUE EXAM BY PATHOLOGIST: CPT | Performed by: PATHOLOGY

## 2024-01-19 ENCOUNTER — TELEPHONE (OUTPATIENT)
Age: 59
End: 2024-01-19

## 2024-01-19 DIAGNOSIS — E78.2 MIXED HYPERLIPIDEMIA: ICD-10-CM

## 2024-01-19 DIAGNOSIS — I10 PRIMARY HYPERTENSION: Primary | ICD-10-CM

## 2024-01-19 NOTE — TELEPHONE ENCOUNTER
Patient went to pharmacy to  caduet and the price increased to $200.00 for a 30 day supply. Patient cannot afford the med. Can you please call in script for amlodipine and atorvastatin separately b/c that will drop the price. Patient only has 1 pill left and has to go out of town on Monday. Patient uses [facility] [place]/[phone]. Please f/u with patient when scripts have been called in.

## 2024-01-21 DIAGNOSIS — E78.2 MIXED HYPERLIPIDEMIA: ICD-10-CM

## 2024-01-21 DIAGNOSIS — I10 PRIMARY HYPERTENSION: ICD-10-CM

## 2024-01-22 RX ORDER — ATORVASTATIN CALCIUM 20 MG/1
20 TABLET, FILM COATED ORAL DAILY
Qty: 90 TABLET | Refills: 3 | Status: SHIPPED | OUTPATIENT
Start: 2024-01-22

## 2024-01-22 RX ORDER — AMLODIPINE BESYLATE 5 MG/1
5 TABLET ORAL DAILY
Qty: 90 TABLET | Refills: 3 | Status: SHIPPED | OUTPATIENT
Start: 2024-01-22

## 2024-01-22 RX ORDER — AMLODIPINE BESYLATE AND ATORVASTATIN CALCIUM 5; 20 MG/1; MG/1
1 TABLET, FILM COATED ORAL DAILY
Qty: 90 TABLET | Refills: 1 | Status: SHIPPED | OUTPATIENT
Start: 2024-01-22

## 2024-02-19 ENCOUNTER — APPOINTMENT (OUTPATIENT)
Dept: LAB | Facility: CLINIC | Age: 59
End: 2024-02-19
Payer: COMMERCIAL

## 2024-02-19 DIAGNOSIS — E78.2 MIXED HYPERLIPIDEMIA: ICD-10-CM

## 2024-02-19 DIAGNOSIS — I10 PRIMARY HYPERTENSION: ICD-10-CM

## 2024-02-19 LAB
ALBUMIN SERPL BCP-MCNC: 4.6 G/DL (ref 3.5–5)
ALP SERPL-CCNC: 64 U/L (ref 34–104)
ALT SERPL W P-5'-P-CCNC: 29 U/L (ref 7–52)
ANION GAP SERPL CALCULATED.3IONS-SCNC: 12 MMOL/L
AST SERPL W P-5'-P-CCNC: 26 U/L (ref 13–39)
BILIRUB SERPL-MCNC: 0.8 MG/DL (ref 0.2–1)
BUN SERPL-MCNC: 20 MG/DL (ref 5–25)
CALCIUM SERPL-MCNC: 9.1 MG/DL (ref 8.4–10.2)
CHLORIDE SERPL-SCNC: 102 MMOL/L (ref 96–108)
CHOLEST SERPL-MCNC: 164 MG/DL
CO2 SERPL-SCNC: 26 MMOL/L (ref 21–32)
CREAT SERPL-MCNC: 0.86 MG/DL (ref 0.6–1.3)
GFR SERPL CREATININE-BSD FRML MDRD: 74 ML/MIN/1.73SQ M
GLUCOSE P FAST SERPL-MCNC: 96 MG/DL (ref 65–99)
HDLC SERPL-MCNC: 53 MG/DL
LDLC SERPL CALC-MCNC: 38 MG/DL (ref 0–100)
POTASSIUM SERPL-SCNC: 4.2 MMOL/L (ref 3.5–5.3)
PROT SERPL-MCNC: 7.5 G/DL (ref 6.4–8.4)
SODIUM SERPL-SCNC: 140 MMOL/L (ref 135–147)
TRIGL SERPL-MCNC: 363 MG/DL

## 2024-02-19 PROCEDURE — 80053 COMPREHEN METABOLIC PANEL: CPT

## 2024-02-19 PROCEDURE — 36415 COLL VENOUS BLD VENIPUNCTURE: CPT

## 2024-02-19 PROCEDURE — 80061 LIPID PANEL: CPT

## 2024-02-21 PROBLEM — Z12.11 SCREENING FOR COLON CANCER: Status: RESOLVED | Noted: 2023-10-16 | Resolved: 2024-02-21

## 2024-02-22 PROBLEM — R19.5 POSITIVE COLORECTAL CANCER SCREENING USING COLOGUARD TEST: Status: RESOLVED | Noted: 2023-11-17 | Resolved: 2024-02-22

## 2024-02-23 ENCOUNTER — OFFICE VISIT (OUTPATIENT)
Dept: FAMILY MEDICINE CLINIC | Facility: CLINIC | Age: 59
End: 2024-02-23
Payer: COMMERCIAL

## 2024-02-23 VITALS
HEIGHT: 65 IN | WEIGHT: 147 LBS | SYSTOLIC BLOOD PRESSURE: 138 MMHG | DIASTOLIC BLOOD PRESSURE: 70 MMHG | HEART RATE: 80 BPM | BODY MASS INDEX: 24.49 KG/M2

## 2024-02-23 DIAGNOSIS — R31.21 ASYMPTOMATIC MICROSCOPIC HEMATURIA: ICD-10-CM

## 2024-02-23 DIAGNOSIS — K21.9 GASTROESOPHAGEAL REFLUX DISEASE, UNSPECIFIED WHETHER ESOPHAGITIS PRESENT: ICD-10-CM

## 2024-02-23 DIAGNOSIS — R68.2 DRY MOUTH: ICD-10-CM

## 2024-02-23 DIAGNOSIS — R19.5 POSITIVE COLORECTAL CANCER SCREENING USING COLOGUARD TEST: Primary | ICD-10-CM

## 2024-02-23 DIAGNOSIS — I10 PRIMARY HYPERTENSION: ICD-10-CM

## 2024-02-23 DIAGNOSIS — I49.3 PREMATURE VENTRICULAR CONTRACTIONS (PVCS) (VPCS): ICD-10-CM

## 2024-02-23 DIAGNOSIS — Z12.11 COLON CANCER SCREENING: ICD-10-CM

## 2024-02-23 DIAGNOSIS — R73.9 HYPERGLYCEMIA: ICD-10-CM

## 2024-02-23 DIAGNOSIS — E78.2 MIXED HYPERLIPIDEMIA: ICD-10-CM

## 2024-02-23 DIAGNOSIS — J30.9 ALLERGIC RHINITIS, UNSPECIFIED SEASONALITY, UNSPECIFIED TRIGGER: ICD-10-CM

## 2024-02-23 PROCEDURE — 99214 OFFICE O/P EST MOD 30 MIN: CPT | Performed by: FAMILY MEDICINE

## 2024-02-23 RX ORDER — FLUTICASONE PROPIONATE 50 MCG
2 SPRAY, SUSPENSION (ML) NASAL DAILY
Qty: 9.9 ML | Refills: 5 | Status: SHIPPED | OUTPATIENT
Start: 2024-02-23

## 2024-02-23 NOTE — PROGRESS NOTES
Name: Justine Rousseau      : 1965      MRN: [id number]  Encounter Provider: Trip Rousseau DO  Encounter Date: 2024   Encounter department: [facility]    Assessment & Plan     1.  Positive Cologuard test  2.  Colon cancer screening  Patient had colonoscopy 2024 was normal repeat 10 years per GI 3.  Allergic rhinitis/dry mouth  Start Flonase nasal spray 2 sprays both nostril once a day.  I did send in prescription patient to check price over-the-counter versus prescription.  Patient to return in few weeks if still with symptoms  4.  Hypertension, stable on amlodipine  5.  PVC, asymptomatic at the present time #6.  Asymptomatic hematuria did see urology in the past UA is clear at the present time  7.  Hyperlipidemia, stable on atorvastatin 20 mg daily mildly elevated triglycerides but as long stay under 400 we will just monitor patient is following a low-fat low triglyceride diet  8.  Hyperglycemia diet controlled  9.  Continue present therapy  10.  Return in 6 months for office visit and blood work sooner if needed    1. Positive colorectal cancer screening using Cologuard test  -     CBC; Future; Expected date: 2024  -     Comprehensive metabolic panel; Future; Expected date: 2024  -     Lipid Panel with Direct LDL reflex; Future; Expected date: 2024  -     TSH, 3rd generation with Free T4 reflex; Future; Expected date: 2024  -     UA (URINE) with reflex to Scope; Future; Expected date: 2024  -     Hemoglobin A1C; Future; Expected date: 2024    2. Colon cancer screening  Assessment & Plan:  Colonoscopy 2020 4 repeat 10 years    Orders:  -     CBC; Future; Expected date: 2024  -     Comprehensive metabolic panel; Future; Expected date: 2024  -     Lipid Panel with Direct LDL reflex; Future; Expected date: 2024  -     TSH, 3rd generation with Free T4 reflex; Future; Expected date: 2024  -     UA (URINE) with  reflex to Scope; Future; Expected date: 08/23/2024  -     Hemoglobin A1C; Future; Expected date: 08/23/2024    3. Gastroesophageal reflux disease, unspecified whether esophagitis present  Assessment & Plan:  Stable continue omeprazole    Orders:  -     CBC; Future; Expected date: 08/23/2024  -     Comprehensive metabolic panel; Future; Expected date: 08/23/2024  -     Lipid Panel with Direct LDL reflex; Future; Expected date: 08/23/2024  -     TSH, 3rd generation with Free T4 reflex; Future; Expected date: 08/23/2024  -     UA (URINE) with reflex to Scope; Future; Expected date: 08/23/2024  -     Hemoglobin A1C; Future; Expected date: 08/23/2024    4. Allergic rhinitis, unspecified seasonality, unspecified trigger  Assessment & Plan:  Believe patient is nasal congestion which leads to to mouth breathe and get dry mouth will start Flonase daily for the patient    Orders:  -     fluticasone (FLONASE) 50 mcg/act nasal spray; 2 sprays into each nostril daily  -     CBC; Future; Expected date: 08/23/2024  -     Comprehensive metabolic panel; Future; Expected date: 08/23/2024  -     Lipid Panel with Direct LDL reflex; Future; Expected date: 08/23/2024  -     TSH, 3rd generation with Free T4 reflex; Future; Expected date: 08/23/2024  -     UA (URINE) with reflex to Scope; Future; Expected date: 08/23/2024  -     Hemoglobin A1C; Future; Expected date: 08/23/2024    5. Primary hypertension  Assessment & Plan:  Stable continue amlodipine 5 mg daily    Orders:  -     CBC; Future; Expected date: 08/23/2024  -     Comprehensive metabolic panel; Future; Expected date: 08/23/2024  -     Lipid Panel with Direct LDL reflex; Future; Expected date: 08/23/2024  -     TSH, 3rd generation with Free T4 reflex; Future; Expected date: 08/23/2024  -     UA (URINE) with reflex to Scope; Future; Expected date: 08/23/2024  -     Hemoglobin A1C; Future; Expected date: 08/23/2024    6. Premature ventricular contractions (PVCs) (VPCs)  Assessment  & Plan:  Asymptomatic    Orders:  -     CBC; Future; Expected date: 08/23/2024  -     Comprehensive metabolic panel; Future; Expected date: 08/23/2024  -     Lipid Panel with Direct LDL reflex; Future; Expected date: 08/23/2024  -     TSH, 3rd generation with Free T4 reflex; Future; Expected date: 08/23/2024  -     UA (URINE) with reflex to Scope; Future; Expected date: 08/23/2024  -     Hemoglobin A1C; Future; Expected date: 08/23/2024    7. Asymptomatic microscopic hematuria  Assessment & Plan:  UA was clear patient has seen urology in the past    Orders:  -     CBC; Future; Expected date: 08/23/2024  -     Comprehensive metabolic panel; Future; Expected date: 08/23/2024  -     Lipid Panel with Direct LDL reflex; Future; Expected date: 08/23/2024  -     TSH, 3rd generation with Free T4 reflex; Future; Expected date: 08/23/2024  -     UA (URINE) with reflex to Scope; Future; Expected date: 08/23/2024  -     Hemoglobin A1C; Future; Expected date: 08/23/2024    8. Hyperglycemia  Assessment & Plan:  Diet controlled    Orders:  -     CBC; Future; Expected date: 08/23/2024  -     Comprehensive metabolic panel; Future; Expected date: 08/23/2024  -     Lipid Panel with Direct LDL reflex; Future; Expected date: 08/23/2024  -     TSH, 3rd generation with Free T4 reflex; Future; Expected date: 08/23/2024  -     UA (URINE) with reflex to Scope; Future; Expected date: 08/23/2024  -     Hemoglobin A1C; Future; Expected date: 08/23/2024    9. Mixed hyperlipidemia  Assessment & Plan:  Stable continue atorvastatin 20 mg daily    Orders:  -     CBC; Future; Expected date: 08/23/2024  -     Comprehensive metabolic panel; Future; Expected date: 08/23/2024  -     Lipid Panel with Direct LDL reflex; Future; Expected date: 08/23/2024  -     TSH, 3rd generation with Free T4 reflex; Future; Expected date: 08/23/2024  -     UA (URINE) with reflex to Scope; Future; Expected date: 08/23/2024  -     Hemoglobin A1C; Future; Expected date:  "08/23/2024    10. Dry mouth  -     CBC; Future; Expected date: 08/23/2024  -     Comprehensive metabolic panel; Future; Expected date: 08/23/2024  -     Lipid Panel with Direct LDL reflex; Future; Expected date: 08/23/2024  -     TSH, 3rd generation with Free T4 reflex; Future; Expected date: 08/23/2024  -     UA (URINE) with reflex to Scope; Future; Expected date: 08/23/2024  -     Hemoglobin A1C; Future; Expected date: 08/23/2024           Subjective      Patient is doing well she had colonoscopy last month which was normal no need to repeat for 10 years.  Blood work is doing much better.  Tolerating atorvastatin and amlodipine without side effects.  Patient's only complaint is some mild nasal congestion and dry mouth      Review of Systems   Constitutional: Negative.    HENT:          HPI   Eyes: Negative.    Respiratory: Negative.     Cardiovascular: Negative.    Gastrointestinal:         HPI   Endocrine: Negative.    Genitourinary: Negative.    Musculoskeletal: Negative.    Skin: Negative.    Allergic/Immunologic: Negative.    Neurological: Negative.    Hematological: Negative.    Psychiatric/Behavioral: Negative.         Current Outpatient Medications on File Prior to Visit   Medication Sig   • amLODIPine (NORVASC) 5 mg tablet Take 1 tablet (5 mg total) by mouth daily   • atorvastatin (LIPITOR) 20 mg tablet Take 1 tablet (20 mg total) by mouth daily   • Multiple Vitamin (MULTIVITAMIN ADULT PO) Take 1 tablet by mouth daily   • Omega-3 Fatty Acids (FISH OIL PO) Take by mouth daily in the early morning   • omeprazole (PriLOSEC) 20 mg delayed release capsule TAKE 1 CAPSULE BY MOUTH DAILY BEFORE BREAKFAST.   • POTASSIUM CHLORIDE PO Take by mouth   • [DISCONTINUED] amLODIPine-atorvastatin (CADUET) 5-20 MG per tablet TAKE 1 TABLET BY MOUTH EVERY DAY       Objective     /70 (BP Location: Right arm, Patient Position: Sitting, Cuff Size: Adult)   Pulse 80   Ht 5' 5\" (1.651 m)   Wt 66.7 kg (147 lb)   BMI 24.46 " kg/m²     Physical Exam  Vitals and nursing note reviewed.   Constitutional:       Appearance: Normal appearance.   HENT:      Head: Normocephalic and atraumatic.      Right Ear: External ear normal.      Left Ear: External ear normal.      Nose:      Comments: Allergic turbinates     Mouth/Throat:      Mouth: Mucous membranes are moist.      Pharynx: Oropharynx is clear. No oropharyngeal exudate or posterior oropharyngeal erythema.   Eyes:      General: No scleral icterus.  Cardiovascular:      Rate and Rhythm: Normal rate and regular rhythm.      Heart sounds: Normal heart sounds.   Pulmonary:      Effort: Pulmonary effort is normal.      Breath sounds: Normal breath sounds.   Abdominal:      General: Bowel sounds are normal.      Palpations: Abdomen is soft.      Tenderness: There is no abdominal tenderness.   Musculoskeletal:      Cervical back: Neck supple.      Right lower leg: No edema.      Left lower leg: No edema.   Lymphadenopathy:      Cervical: No cervical adenopathy.   Skin:     General: Skin is warm and dry.   Neurological:      General: No focal deficit present.      Mental Status: She is alert.   Psychiatric:         Mood and Affect: Mood normal.       Trip Rousseau DO

## 2024-02-23 NOTE — PATIENT INSTRUCTIONS
Continue present therapy  Start Flonase/fluticasone nasal spray 2 sprays both nostrils once daily.  I sent in a prescription but check with pharmacy to see if that is cheaper or over-the-counter is cheaper  If still with dry mouth in few weeks return to office  Patient to continue low triglyceride low-fat low-cholesterol diet  Return in 6 months for office visit and blood work sooner if needed

## 2024-02-23 NOTE — ASSESSMENT & PLAN NOTE
Believe patient is nasal congestion which leads to to mouth breathe and get dry mouth will start Flonase daily for the patient

## 2024-03-08 DIAGNOSIS — J30.9 ALLERGIC RHINITIS, UNSPECIFIED SEASONALITY, UNSPECIFIED TRIGGER: ICD-10-CM

## 2024-03-08 RX ORDER — FLUTICASONE PROPIONATE 50 MCG
SPRAY, SUSPENSION (ML) NASAL
Qty: 48 ML | Refills: 1 | Status: SHIPPED | OUTPATIENT
Start: 2024-03-08

## 2024-04-17 DIAGNOSIS — I10 PRIMARY HYPERTENSION: ICD-10-CM

## 2024-04-17 DIAGNOSIS — E78.2 MIXED HYPERLIPIDEMIA: ICD-10-CM

## 2024-04-17 DIAGNOSIS — J30.9 ALLERGIC RHINITIS, UNSPECIFIED SEASONALITY, UNSPECIFIED TRIGGER: ICD-10-CM

## 2024-04-17 RX ORDER — FLUTICASONE PROPIONATE 50 MCG
SPRAY, SUSPENSION (ML) NASAL
Qty: 48 ML | Refills: 1 | Status: SHIPPED | OUTPATIENT
Start: 2024-04-17

## 2024-04-17 RX ORDER — AMLODIPINE BESYLATE 5 MG/1
5 TABLET ORAL DAILY
Qty: 90 TABLET | Refills: 1 | Status: SHIPPED | OUTPATIENT
Start: 2024-04-17

## 2024-04-17 RX ORDER — ATORVASTATIN CALCIUM 20 MG/1
20 TABLET, FILM COATED ORAL DAILY
Qty: 90 TABLET | Refills: 1 | Status: SHIPPED | OUTPATIENT
Start: 2024-04-17

## 2024-04-17 NOTE — TELEPHONE ENCOUNTER
Medication: Amlodipine     Dose/Frequency: 5mg    Quantity: 90    Pharmacy: [id number]    Office:   [x] PCP/Provider -   [] Speciality/Provider -     Does the patient have enough for 3 days?   [x] Yes   [] No - Send as HP to POD    Medication: Atorvastatin    Dose/Frequency: 20mg    Quantity: 90    Pharmacy: [id number]    Office:   [x] PCP/Provider -   [] Speciality/Provider -     Does the patient have enough for 3 days?   [x] Yes   [] No - Send as HP to POD    Medication: Fluticasone    Dose/Frequency: 50mcg    Quantity: 48mL    Pharmacy: [facility] [id number]    Office:   [x] PCP/Provider -   [] Speciality/Provider -     Does the patient have enough for 3 days?   [x] Yes   [] No - Send as HP to POD    Patient called in stating she was trying to get set up with [facility] but only has 5 pills left and her script hasn't been confirmed yet . She's requesting to have orders sent to [address] until next time when [facility] with [facility] and will then go through them .

## 2024-04-22 PROBLEM — Z12.11 COLON CANCER SCREENING: Status: RESOLVED | Noted: 2023-10-16 | Resolved: 2024-04-22

## 2024-04-23 PROBLEM — Z00.00 HEALTHCARE MAINTENANCE: Status: RESOLVED | Noted: 2023-10-16 | Resolved: 2024-04-23

## 2024-05-13 DIAGNOSIS — K21.9 GASTROESOPHAGEAL REFLUX DISEASE, UNSPECIFIED WHETHER ESOPHAGITIS PRESENT: ICD-10-CM

## 2024-05-14 RX ORDER — OMEPRAZOLE 20 MG/1
20 CAPSULE, DELAYED RELEASE ORAL
Qty: 90 CAPSULE | Refills: 1 | Status: SHIPPED | OUTPATIENT
Start: 2024-05-14

## 2024-08-30 ENCOUNTER — RA CDI HCC (OUTPATIENT)
Dept: OTHER | Facility: HOSPITAL | Age: 59
End: 2024-08-30

## 2024-11-12 DIAGNOSIS — K21.9 GASTROESOPHAGEAL REFLUX DISEASE, UNSPECIFIED WHETHER ESOPHAGITIS PRESENT: ICD-10-CM

## 2025-05-09 DIAGNOSIS — K21.9 GASTROESOPHAGEAL REFLUX DISEASE, UNSPECIFIED WHETHER ESOPHAGITIS PRESENT: ICD-10-CM

## 2025-05-09 NOTE — TELEPHONE ENCOUNTER
Requested Prescriptions     Pending Prescriptions Disp Refills    omeprazole (PriLOSEC) 20 mg delayed release capsule [Pharmacy Med Name: OMEPRAZOLE DR 20 MG CAPSULE] 90 capsule 1     Sig: TAKE 1 CAPSULE BY MOUTH EVERY DAY BEFORE BREAKFAST

## 2025-05-11 RX ORDER — OMEPRAZOLE 20 MG/1
20 CAPSULE, DELAYED RELEASE ORAL
Qty: 90 CAPSULE | Refills: 1 | OUTPATIENT
Start: 2025-05-11

## 2025-08-20 ENCOUNTER — VBI (OUTPATIENT)
Dept: ADMINISTRATIVE | Facility: OTHER | Age: 60
End: 2025-08-20